# Patient Record
Sex: FEMALE | URBAN - METROPOLITAN AREA
[De-identification: names, ages, dates, MRNs, and addresses within clinical notes are randomized per-mention and may not be internally consistent; named-entity substitution may affect disease eponyms.]

---

## 2019-06-28 ENCOUNTER — APPOINTMENT (RX ONLY)
Dept: URBAN - METROPOLITAN AREA CLINIC 26 | Facility: CLINIC | Age: 54
Setting detail: DERMATOLOGY
End: 2019-06-28

## 2019-06-28 DIAGNOSIS — L82.1 OTHER SEBORRHEIC KERATOSIS: ICD-10-CM

## 2019-06-28 DIAGNOSIS — L91.8 OTHER HYPERTROPHIC DISORDERS OF THE SKIN: ICD-10-CM

## 2019-06-28 DIAGNOSIS — D22 MELANOCYTIC NEVI: ICD-10-CM

## 2019-06-28 DIAGNOSIS — D18.0 HEMANGIOMA: ICD-10-CM

## 2019-06-28 DIAGNOSIS — Z85.828 PERSONAL HISTORY OF OTHER MALIGNANT NEOPLASM OF SKIN: ICD-10-CM

## 2019-06-28 DIAGNOSIS — L81.4 OTHER MELANIN HYPERPIGMENTATION: ICD-10-CM

## 2019-06-28 PROBLEM — D48.5 NEOPLASM OF UNCERTAIN BEHAVIOR OF SKIN: Status: ACTIVE | Noted: 2019-06-28

## 2019-06-28 PROBLEM — D18.01 HEMANGIOMA OF SKIN AND SUBCUTANEOUS TISSUE: Status: ACTIVE | Noted: 2019-06-28

## 2019-06-28 PROBLEM — D23.5 OTHER BENIGN NEOPLASM OF SKIN OF TRUNK: Status: ACTIVE | Noted: 2019-06-28

## 2019-06-28 PROBLEM — D22.9 MELANOCYTIC NEVI, UNSPECIFIED: Status: ACTIVE | Noted: 2019-06-28

## 2019-06-28 PROCEDURE — ? BIOPSY BY SHAVE METHOD

## 2019-06-28 PROCEDURE — ? DIAGNOSIS COMMENT

## 2019-06-28 PROCEDURE — 11102 TANGNTL BX SKIN SINGLE LES: CPT

## 2019-06-28 PROCEDURE — ? COUNSELING

## 2019-06-28 PROCEDURE — ? ADDITIONAL NOTES

## 2019-06-28 PROCEDURE — 99214 OFFICE O/P EST MOD 30 MIN: CPT | Mod: 25

## 2019-06-28 PROCEDURE — 11103 TANGNTL BX SKIN EA SEP/ADDL: CPT

## 2019-06-28 PROCEDURE — ? SUNSCREEN RECOMMENDATIONS

## 2019-06-28 ASSESSMENT — LOCATION DETAILED DESCRIPTION DERM
LOCATION DETAILED: EPIGASTRIC SKIN
LOCATION DETAILED: LEFT RIB CAGE
LOCATION DETAILED: LEFT RIB CAGE

## 2019-06-28 ASSESSMENT — LOCATION SIMPLE DESCRIPTION DERM
LOCATION SIMPLE: ABDOMEN
LOCATION SIMPLE: ABDOMEN

## 2019-06-28 ASSESSMENT — LOCATION ZONE DERM
LOCATION ZONE: TRUNK
LOCATION ZONE: TRUNK

## 2019-06-28 NOTE — PROCEDURE: ADDITIONAL NOTES
Additional Notes: Shave removal with lidocaine and epi, included in path bottle of left axillae (multiple lesions present), necessary as rubbing on clothing. Patient ok to pay out of pocket if not covered.
Detail Level: Simple

## 2019-06-28 NOTE — PROCEDURE: BIOPSY BY SHAVE METHOD
Detail Level: Detailed
Billing Type: Third-Party Bill
Lab: -17
Destruction After The Procedure: No
Post-Care Instructions: I reviewed with the patient in detail post-care instructions. Patient is to keep the biopsy site dry overnight, and then apply bacitracin twice daily until healed. Patient may apply hydrogen peroxide soaks to remove any crusting.
Cryotherapy Text: The wound bed was treated with cryotherapy after the biopsy was performed.
Size Of Lesion In Cm: 0
Anesthesia Type: 1% lidocaine with epinephrine
Lab Facility: 3
Wound Care: Petrolatum
Depth Of Biopsy: dermis
Notification Instructions: Patient will be notified of biopsy results. However, patient instructed to call the office if not contacted within 2 weeks.
Electrodesiccation Text: The wound bed was treated with electrodesiccation after the biopsy was performed.
Path Notes (To The Dermatopathologist): .
Biopsy Type: H and E
Consent: Written consent was obtained and risks were reviewed including but not limited to scarring, infection, bleeding, scabbing, incomplete removal, nerve damage and allergy to anesthesia.
Type Of Destruction Used: Curettage
Anesthesia Volume In Cc (Will Not Render If 0): 0.5
Dressing: bandage
Electrodesiccation And Curettage Text: The wound bed was treated with electrodesiccation and curettage after the biopsy was performed.
Was A Bandage Applied: Yes
Silver Nitrate Text: The wound bed was treated with silver nitrate after the biopsy was performed.
Hemostasis: Electrocautery
Biopsy Method: Dermablade
Curettage Text: The wound bed was treated with curettage after the biopsy was performed.
Path Notes (To The Dermatopathologist): .

## 2019-12-16 ENCOUNTER — HOSPITAL ENCOUNTER (EMERGENCY)
Facility: HOSPITAL | Age: 54
Discharge: HOME | End: 2019-12-16
Attending: EMERGENCY MEDICINE | Admitting: EMERGENCY MEDICINE
Payer: COMMERCIAL

## 2019-12-16 ENCOUNTER — APPOINTMENT (EMERGENCY)
Dept: RADIOLOGY | Facility: HOSPITAL | Age: 54
End: 2019-12-16
Payer: COMMERCIAL

## 2019-12-16 VITALS
WEIGHT: 253 LBS | RESPIRATION RATE: 20 BRPM | OXYGEN SATURATION: 95 % | HEIGHT: 65 IN | TEMPERATURE: 97.8 F | SYSTOLIC BLOOD PRESSURE: 161 MMHG | HEART RATE: 80 BPM | DIASTOLIC BLOOD PRESSURE: 85 MMHG | BODY MASS INDEX: 42.15 KG/M2

## 2019-12-16 DIAGNOSIS — R07.9 CHEST PAIN: Primary | ICD-10-CM

## 2019-12-16 LAB
ALBUMIN SERPL BCP-MCNC: 3.7 G/DL (ref 3.5–5)
ALP SERPL-CCNC: 75 U/L (ref 46–116)
ALT SERPL W P-5'-P-CCNC: 43 U/L (ref 12–78)
ANION GAP SERPL CALCULATED.3IONS-SCNC: 7 MMOL/L (ref 4–13)
AST SERPL W P-5'-P-CCNC: 40 U/L (ref 5–45)
ATRIAL RATE: 77 BPM
BASOPHILS # BLD AUTO: 0.05 THOUSANDS/ΜL (ref 0–0.1)
BASOPHILS NFR BLD AUTO: 1 % (ref 0–1)
BILIRUB SERPL-MCNC: 0.4 MG/DL (ref 0.2–1)
BUN SERPL-MCNC: 12 MG/DL (ref 5–25)
CALCIUM SERPL-MCNC: 9.2 MG/DL (ref 8.3–10.1)
CHLORIDE SERPL-SCNC: 105 MMOL/L (ref 100–108)
CO2 SERPL-SCNC: 28 MMOL/L (ref 21–32)
CREAT SERPL-MCNC: 0.68 MG/DL (ref 0.6–1.3)
D DIMER PPP FEU-MCNC: 0.28 UG/ML FEU
EOSINOPHIL # BLD AUTO: 0.15 THOUSAND/ΜL (ref 0–0.61)
EOSINOPHIL NFR BLD AUTO: 2 % (ref 0–6)
ERYTHROCYTE [DISTWIDTH] IN BLOOD BY AUTOMATED COUNT: 12.4 % (ref 11.6–15.1)
GFR SERPL CREATININE-BSD FRML MDRD: 101 ML/MIN/1.73SQ M
GLUCOSE SERPL-MCNC: 75 MG/DL (ref 65–140)
HCT VFR BLD AUTO: 43.3 % (ref 34.8–46.1)
HGB BLD-MCNC: 14.1 G/DL (ref 11.5–15.4)
IMM GRANULOCYTES # BLD AUTO: 0.05 THOUSAND/UL (ref 0–0.2)
IMM GRANULOCYTES NFR BLD AUTO: 1 % (ref 0–2)
LYMPHOCYTES # BLD AUTO: 2.41 THOUSANDS/ΜL (ref 0.6–4.47)
LYMPHOCYTES NFR BLD AUTO: 31 % (ref 14–44)
MCH RBC QN AUTO: 27.8 PG (ref 26.8–34.3)
MCHC RBC AUTO-ENTMCNC: 32.6 G/DL (ref 31.4–37.4)
MCV RBC AUTO: 85 FL (ref 82–98)
MONOCYTES # BLD AUTO: 0.54 THOUSAND/ΜL (ref 0.17–1.22)
MONOCYTES NFR BLD AUTO: 7 % (ref 4–12)
NEUTROPHILS # BLD AUTO: 4.55 THOUSANDS/ΜL (ref 1.85–7.62)
NEUTS SEG NFR BLD AUTO: 58 % (ref 43–75)
NRBC BLD AUTO-RTO: 0 /100 WBCS
P AXIS: 41 DEGREES
PLATELET # BLD AUTO: 412 THOUSANDS/UL (ref 149–390)
PMV BLD AUTO: 9 FL (ref 8.9–12.7)
POTASSIUM SERPL-SCNC: 4.8 MMOL/L (ref 3.5–5.3)
PR INTERVAL: 164 MS
PROT SERPL-MCNC: 8 G/DL (ref 6.4–8.2)
QRS AXIS: 48 DEGREES
QRSD INTERVAL: 76 MS
QT INTERVAL: 390 MS
QTC INTERVAL: 441 MS
RBC # BLD AUTO: 5.07 MILLION/UL (ref 3.81–5.12)
SODIUM SERPL-SCNC: 140 MMOL/L (ref 136–145)
T WAVE AXIS: 53 DEGREES
TROPONIN I SERPL-MCNC: <0.02 NG/ML
VENTRICULAR RATE: 77 BPM
WBC # BLD AUTO: 7.75 THOUSAND/UL (ref 4.31–10.16)

## 2019-12-16 PROCEDURE — 71046 X-RAY EXAM CHEST 2 VIEWS: CPT

## 2019-12-16 PROCEDURE — 85379 FIBRIN DEGRADATION QUANT: CPT | Performed by: PHYSICIAN ASSISTANT

## 2019-12-16 PROCEDURE — 80053 COMPREHEN METABOLIC PANEL: CPT | Performed by: PHYSICIAN ASSISTANT

## 2019-12-16 PROCEDURE — 93005 ELECTROCARDIOGRAM TRACING: CPT

## 2019-12-16 PROCEDURE — 36415 COLL VENOUS BLD VENIPUNCTURE: CPT | Performed by: PHYSICIAN ASSISTANT

## 2019-12-16 PROCEDURE — 84484 ASSAY OF TROPONIN QUANT: CPT | Performed by: PHYSICIAN ASSISTANT

## 2019-12-16 PROCEDURE — 85025 COMPLETE CBC W/AUTO DIFF WBC: CPT | Performed by: PHYSICIAN ASSISTANT

## 2019-12-16 PROCEDURE — 93010 ELECTROCARDIOGRAM REPORT: CPT | Performed by: INTERNAL MEDICINE

## 2019-12-16 PROCEDURE — 99285 EMERGENCY DEPT VISIT HI MDM: CPT | Performed by: PHYSICIAN ASSISTANT

## 2019-12-16 PROCEDURE — 99285 EMERGENCY DEPT VISIT HI MDM: CPT

## 2019-12-16 RX ORDER — FOLIC ACID/MULTIVIT,IRON,MINER .4-18-35
TABLET,CHEWABLE ORAL
COMMUNITY

## 2019-12-16 RX ORDER — ASCORBIC ACID 500 MG
TABLET ORAL
COMMUNITY

## 2019-12-16 NOTE — DISCHARGE INSTRUCTIONS
Follow up with family doctor in 1-2 days for recheck  Obtain stress test   Return to ER if symptoms worsen

## 2019-12-16 NOTE — ED PROVIDER NOTES
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 and was in [place] a couple weeks ago  She denies fevers, chills, but did have a URI 2 weeks ago  Patient states she has been under increased stress at her job lately  History provided by:  Patient  Chest Pain   Pain location:  L chest and substernal area  Pain quality: aching and tightness    Pain radiates to:  Does not radiate  Pain radiates to the back: no    Pain severity:  Mild  Onset quality:  Gradual  Duration:  2 weeks  Timing:  Intermittent  Progression:  Unchanged  Chronicity:  New  Context: at rest    Relieved by:  Nothing  Worsened by:  Nothing tried  Ineffective treatments:  None tried  Associated symptoms: cough    Associated symptoms: no abdominal pain, no altered mental status, no back pain, no diaphoresis, no dizziness, no fever, no heartburn, no lower extremity edema, no nausea, no numbness, no palpitations, no shortness of breath, not vomiting and no weakness    Risk factors: obesity    Risk factors: no aortic disease, no birth control, no coronary artery disease, no diabetes mellitus, no high cholesterol, no hypertension, no immobilization, not pregnant, no prior DVT/PE, no smoking and no surgery        Prior to Admission Medications   Prescriptions Last Dose Informant Patient Reported? Taking?   ascorbic acid (VITAMIN C) 500 mg tablet   Yes No   Sig: Take by mouth   multivitamin-iron-minerals-folic acid (CENTRUM) chewable tablet   Yes No   Sig: Chew      Facility-Administered Medications: None       History reviewed  No pertinent past medical history      Past Surgical History:   Procedure Laterality Date    ANAL FISSURECTOMY      LAPAROSCOPIC GASTRIC BANDING      1/12       History reviewed  No pertinent family history  I have reviewed and agree with the history as documented  Social History     Tobacco Use    Smoking status: Never Smoker    Smokeless tobacco: Never Used   Substance Use Topics    Alcohol use: Yes    Drug use: Never        Review of Systems   Constitutional: Negative for diaphoresis and fever  HENT: Negative  Respiratory: Positive for cough  Negative for shortness of breath  Cardiovascular: Positive for chest pain  Negative for palpitations and leg swelling  Gastrointestinal: Negative for abdominal pain, heartburn, nausea and vomiting  Musculoskeletal: Negative for back pain and neck pain  Skin: Negative for color change and rash  Neurological: Negative for dizziness, weakness and numbness  Psychiatric/Behavioral: Negative for confusion  All other systems reviewed and are negative  Physical Exam  Physical Exam   Constitutional: She is oriented to person, place, and time  She appears well-developed and well-nourished  She is cooperative  She does not appear ill  No distress  HENT:   Head: Normocephalic and atraumatic  Right Ear: Hearing normal    Left Ear: Hearing normal    Nose: Nose normal    Mouth/Throat: Oropharynx is clear and moist and mucous membranes are normal    Eyes: Conjunctivae are normal    Neck: Normal range of motion  Cardiovascular: Normal rate, regular rhythm and normal heart sounds  No murmur heard  Pulmonary/Chest: Effort normal and breath sounds normal  She has no wheezes  She has no rhonchi  She has no rales  Abdominal: Soft  Normal appearance  There is no tenderness  Musculoskeletal: Normal range of motion  She exhibits no edema or tenderness  Neurological: She is alert and oriented to person, place, and time  She has normal strength  No cranial nerve deficit or sensory deficit  Gait normal  GCS eye subscore is 4   GCS verbal subscore is 5  GCS motor subscore is 6  Skin: Skin is warm and dry  No rash noted  She is not diaphoretic  No pallor  Psychiatric: She has a normal mood and affect  Her speech is normal  Cognition and memory are normal    Vitals reviewed        Vital Signs  ED Triage Vitals   Temperature Pulse Respirations Blood Pressure SpO2   12/16/19 1515 12/16/19 1515 12/16/19 1515 12/16/19 1515 12/16/19 1515   97 8 °F (36 6 °C) 80 20 161/85 95 %      Temp src Heart Rate Source Patient Position - Orthostatic VS BP Location FiO2 (%)   -- -- -- -- --             Pain Score       12/16/19 1512       1           Vitals:    12/16/19 1515   BP: 161/85   Pulse: 80         Visual Acuity      ED Medications  Medications - No data to display    Diagnostic Studies  Results Reviewed     Procedure Component Value Units Date/Time    D-Dimer [[id number]]  (Normal) Collected:  12/16/19 1548    Lab Status:  Final result Specimen:  Blood from Arm, Left Updated:  12/16/19 1616     D-Dimer, Quant 0 28 ug/ml FEU     Troponin I [[id number]]  (Normal) Collected:  12/16/19 1525    Lab Status:  Final result Specimen:  Blood from Arm, Left Updated:  12/16/19 1555     Troponin I <0 02 ng/mL     Comprehensive metabolic panel [[id number]] Collected:  12/16/19 1525    Lab Status:  Final result Specimen:  Blood from Arm, Left Updated:  12/16/19 1552     Sodium 140 mmol/L      Potassium 4 8 mmol/L      Chloride 105 mmol/L      CO2 28 mmol/L      ANION GAP 7 mmol/L      BUN 12 mg/dL      Creatinine 0 68 mg/dL      Glucose 75 mg/dL      Calcium 9 2 mg/dL      AST 40 U/L      ALT 43 U/L      Alkaline Phosphatase 75 U/L      Total Protein 8 0 g/dL      Albumin 3 7 g/dL      Total Bilirubin 0 40 mg/dL      eGFR 101 ml/min/1 73sq m     Narrative:       Yojana guidelines for Chronic Kidney Disease (CKD):     Stage 1 with normal or high GFR (GFR > 90 mL/min/1 73 square meters)    Stage 2 Mild CKD (GFR = 60-89 mL/min/1 73 square meters)   Stage 3A Moderate CKD (GFR = 45-59 mL/min/1 73 square meters)    Stage 3B Moderate CKD (GFR = 30-44 mL/min/1 73 square meters)    Stage 4 Severe CKD (GFR = 15-29 mL/min/1 73 square meters)    Stage 5 End Stage CKD (GFR <15 mL/min/1 73 square meters)  Note: GFR calculation is accurate only with a steady state creatinine    CBC and differential [[id number]]  (Abnormal) Collected:  12/16/19 1525    Lab Status:  Final result Specimen:  Blood from Arm, Left Updated:  12/16/19 1535     WBC 7 75 Thousand/uL      RBC 5 07 Million/uL      Hemoglobin 14 1 g/dL      Hematocrit 43 3 %      MCV 85 fL      MCH 27 8 pg      MCHC 32 6 g/dL      RDW 12 4 %      MPV 9 0 fL      Platelets 672 Thousands/uL      nRBC 0 /100 WBCs      Neutrophils Relative 58 %      Immat GRANS % 1 %      Lymphocytes Relative 31 %      Monocytes Relative 7 %      Eosinophils Relative 2 %      Basophils Relative 1 %      Neutrophils Absolute 4 55 Thousands/µL      Immature Grans Absolute 0 05 Thousand/uL      Lymphocytes Absolute 2 41 Thousands/µL      Monocytes Absolute 0 54 Thousand/µL      Eosinophils Absolute 0 15 Thousand/µL      Basophils Absolute 0 05 Thousands/µL                  XR chest 2 views   ED Interpretation by Camden Peters PA-C (12/16 0148)   No acute abnormalities                    Procedures  ECG 12 Lead Documentation Only  Date/Time: 12/16/2019 3:22 PM  Performed by: Camden Peters PA-C  Authorized by: Camden Peters PA-C     Indications / Diagnosis:  Chest pain  ECG reviewed by me, the ED Provider: yes    Patient location:  ED  Previous ECG:     Previous ECG:  Unavailable  Rate:     ECG rate:  77  Rhythm:     Rhythm: sinus rhythm    Conduction:     Conduction: normal    ST segments:     ST segments:  Normal  T waves:     T waves: normal               ED Course         HEART Risk Score      Most Recent Value   History  0 Filed at: 12/16/2019 1601   ECG  0 Filed at: 12/16/2019 1601   Age  1 Filed at: 12/16/2019 1601 Risk Factors  0 Filed at: 12/16/2019 1601   Troponin  0 Filed at: 12/16/2019 1601   Heart Score Risk Calculator   History  0 Filed at: 12/16/2019 1601   ECG  0 Filed at: 12/16/2019 1601   Age  1 Filed at: 12/16/2019 1601   Risk Factors  0 Filed at: 12/16/2019 1601   Troponin  0 Filed at: 12/16/2019 1601   HEART Score  1 Filed at: 12/16/2019 1601   HEART Score  1 Filed at: 12/16/2019 1601                      Wells' Criteria for PE      Most Recent Value   Wells' Criteria for PE   Clinical signs and symptoms of DVT  0 Filed at: 12/16/2019 1621   PE is primary diagnosis or equally likely  0 Filed at: 12/16/2019 1621   HR >100  0 Filed at: 12/16/2019 1621   Immobilization at least 3 days or Surgery in the previous 4 weeks  0 Filed at: 12/16/2019 1621   Previous, objectively diagnosed PE or DVT  0 Filed at: 12/16/2019 1621   Hemoptysis  0 Filed at: 12/16/2019 1621   Malignancy with treatment within 6 months or palliative  0 Filed at: 12/16/2019 1621   Wells' Criteria Total  0 Filed at: 12/16/2019 1621            [facility]  Number of Diagnoses or Management Options  Chest pain: new and requires workup  Diagnosis management comments: Patient with chest pain, will order labs, CXR, EKG to r/o cardiac or pulmonary disease  Amount and/or Complexity of Data Reviewed  Clinical lab tests: ordered and reviewed  Tests in the radiology section of CPT®: ordered and reviewed    Patient Progress  Patient progress: stable        Disposition  Final diagnoses:   Chest pain     Time reflects when diagnosis was documented in both MDM as applicable and the Disposition within this note     Time User Action Codes Description Comment    12/16/2019  4:19 PM Metro Prows Add [R07 9] Chest pain       ED Disposition     ED Disposition Condition Date/Time Comment    Discharge Stable Mon Dec 16, 2019  4:19 PM Angelic Galeana discharge to home/self care              Follow-up Information     Follow up With Specialties Details Why Contact Info    your family doctor  Call in 1 day For recheck           Patient's Medications   Discharge Prescriptions    No medications on file     Outpatient Discharge Orders   Stress test only, exercise   Standing Status: Future Standing Exp   Date: 12/16/23       ED Provider  Electronically Signed by           Cristy Fox PA-C  12/16/19 2043

## 2021-01-08 ENCOUNTER — APPOINTMENT (RX ONLY)
Dept: URBAN - METROPOLITAN AREA CLINIC 26 | Facility: CLINIC | Age: 56
Setting detail: DERMATOLOGY
End: 2021-01-08

## 2021-01-08 DIAGNOSIS — D18.0 HEMANGIOMA: ICD-10-CM

## 2021-01-08 DIAGNOSIS — L81.4 OTHER MELANIN HYPERPIGMENTATION: ICD-10-CM

## 2021-01-08 DIAGNOSIS — L40.0 PSORIASIS VULGARIS: ICD-10-CM

## 2021-01-08 DIAGNOSIS — L82.1 OTHER SEBORRHEIC KERATOSIS: ICD-10-CM

## 2021-01-08 DIAGNOSIS — D22 MELANOCYTIC NEVI: ICD-10-CM

## 2021-01-08 DIAGNOSIS — Z85.828 PERSONAL HISTORY OF OTHER MALIGNANT NEOPLASM OF SKIN: ICD-10-CM

## 2021-01-08 PROBLEM — D23.5 OTHER BENIGN NEOPLASM OF SKIN OF TRUNK: Status: ACTIVE | Noted: 2021-01-08

## 2021-01-08 PROBLEM — D22.9 MELANOCYTIC NEVI, UNSPECIFIED: Status: ACTIVE | Noted: 2021-01-08

## 2021-01-08 PROBLEM — D18.01 HEMANGIOMA OF SKIN AND SUBCUTANEOUS TISSUE: Status: ACTIVE | Noted: 2021-01-08

## 2021-01-08 PROBLEM — D22.72 MELANOCYTIC NEVI OF LEFT LOWER LIMB, INCLUDING HIP: Status: ACTIVE | Noted: 2021-01-08

## 2021-01-08 PROCEDURE — ? SUNSCREEN RECOMMENDATIONS

## 2021-01-08 PROCEDURE — ? TREATMENT REGIMEN

## 2021-01-08 PROCEDURE — ? COUNSELING

## 2021-01-08 PROCEDURE — 99213 OFFICE O/P EST LOW 20 MIN: CPT

## 2021-01-08 PROCEDURE — ? ADDITIONAL NOTES

## 2021-01-08 PROCEDURE — ? FULL BODY SKIN EXAM

## 2021-01-08 ASSESSMENT — LOCATION SIMPLE DESCRIPTION DERM
LOCATION SIMPLE: LEFT POSTERIOR THIGH
LOCATION SIMPLE: LEFT EAR

## 2021-01-08 ASSESSMENT — LOCATION DETAILED DESCRIPTION DERM
LOCATION DETAILED: LEFT CRUS OF HELIX
LOCATION DETAILED: LEFT DISTAL POSTERIOR THIGH

## 2021-01-08 ASSESSMENT — LOCATION ZONE DERM
LOCATION ZONE: LEG
LOCATION ZONE: EAR

## 2021-01-08 NOTE — PROCEDURE: TREATMENT REGIMEN
Action 4: Continue
Otc Regimen: Antidandruff shampoo
Detail Level: Simple
Other Instructions: Patient has topicals at home and is not motivated to treat

## 2021-03-10 DIAGNOSIS — Z23 ENCOUNTER FOR IMMUNIZATION: ICD-10-CM

## 2021-06-02 ENCOUNTER — RECORDS - HEALTHEAST (OUTPATIENT)
Dept: ADMINISTRATIVE | Facility: CLINIC | Age: 56
End: 2021-06-02

## 2022-04-04 ENCOUNTER — APPOINTMENT (RX ONLY)
Dept: URBAN - METROPOLITAN AREA CLINIC 6 | Facility: CLINIC | Age: 57
Setting detail: DERMATOLOGY
End: 2022-04-04

## 2022-04-04 DIAGNOSIS — L65.8 OTHER SPECIFIED NONSCARRING HAIR LOSS: ICD-10-CM

## 2022-04-04 DIAGNOSIS — Z85.828 PERSONAL HISTORY OF OTHER MALIGNANT NEOPLASM OF SKIN: ICD-10-CM | Status: STABLE

## 2022-04-04 DIAGNOSIS — Z71.89 OTHER SPECIFIED COUNSELING: ICD-10-CM

## 2022-04-04 DIAGNOSIS — L81.4 OTHER MELANIN HYPERPIGMENTATION: ICD-10-CM

## 2022-04-04 DIAGNOSIS — L82.1 OTHER SEBORRHEIC KERATOSIS: ICD-10-CM

## 2022-04-04 DIAGNOSIS — D22 MELANOCYTIC NEVI: ICD-10-CM

## 2022-04-04 DIAGNOSIS — D18.0 HEMANGIOMA: ICD-10-CM

## 2022-04-04 PROBLEM — D18.01 HEMANGIOMA OF SKIN AND SUBCUTANEOUS TISSUE: Status: ACTIVE | Noted: 2022-04-04

## 2022-04-04 PROBLEM — D22.5 MELANOCYTIC NEVI OF TRUNK: Status: ACTIVE | Noted: 2022-04-04

## 2022-04-04 PROBLEM — D23.5 OTHER BENIGN NEOPLASM OF SKIN OF TRUNK: Status: ACTIVE | Noted: 2022-04-04

## 2022-04-04 PROCEDURE — 99203 OFFICE O/P NEW LOW 30 MIN: CPT

## 2022-04-04 PROCEDURE — ? COUNSELING

## 2022-04-04 PROCEDURE — ? PRESCRIPTION MEDICATION MANAGEMENT

## 2022-04-04 PROCEDURE — ? SUNSCREEN TREATMENT REGIMEN

## 2022-04-04 PROCEDURE — ? PRESCRIPTION

## 2022-04-04 RX ORDER — MINOXIDIL 2.5 MG/1
1 TABLET ORAL QD
Qty: 30 | Refills: 3 | Status: ERX | COMMUNITY
Start: 2022-04-04

## 2022-04-04 RX ADMIN — MINOXIDIL 1: 2.5 TABLET ORAL at 00:00

## 2022-04-04 ASSESSMENT — LOCATION SIMPLE DESCRIPTION DERM
LOCATION SIMPLE: ABDOMEN
LOCATION SIMPLE: UPPER BACK
LOCATION SIMPLE: LEFT HAND
LOCATION SIMPLE: RIGHT HAND
LOCATION SIMPLE: CHEST
LOCATION SIMPLE: SCALP
LOCATION SIMPLE: ANTERIOR SCALP
LOCATION SIMPLE: LEFT FOREHEAD

## 2022-04-04 ASSESSMENT — LOCATION DETAILED DESCRIPTION DERM
LOCATION DETAILED: SUPERIOR THORACIC SPINE
LOCATION DETAILED: MIDDLE STERNUM
LOCATION DETAILED: MID-FRONTAL SCALP
LOCATION DETAILED: SUBXIPHOID
LOCATION DETAILED: RIGHT SUPERIOR PARIETAL SCALP
LOCATION DETAILED: LEFT ULNAR DORSAL HAND
LOCATION DETAILED: LEFT INFERIOR FOREHEAD
LOCATION DETAILED: EPIGASTRIC SKIN
LOCATION DETAILED: LEFT RADIAL DORSAL HAND
LOCATION DETAILED: RIGHT RADIAL DORSAL HAND

## 2022-04-04 ASSESSMENT — LOCATION ZONE DERM
LOCATION ZONE: FACE
LOCATION ZONE: TRUNK
LOCATION ZONE: SCALP
LOCATION ZONE: HAND

## 2022-05-05 ENCOUNTER — APPOINTMENT (RX ONLY)
Dept: URBAN - METROPOLITAN AREA CLINIC 20 | Facility: CLINIC | Age: 57
Setting detail: DERMATOLOGY
End: 2022-05-05

## 2022-05-05 DIAGNOSIS — Z41.9 ENCOUNTER FOR PROCEDURE FOR PURPOSES OTHER THAN REMEDYING HEALTH STATE, UNSPECIFIED: ICD-10-CM

## 2022-05-05 PROCEDURE — ? FACIAL

## 2022-05-05 ASSESSMENT — LOCATION DETAILED DESCRIPTION DERM
LOCATION DETAILED: LEFT CENTRAL MALAR CHEEK
LOCATION DETAILED: LEFT FOREHEAD

## 2022-05-05 ASSESSMENT — LOCATION ZONE DERM: LOCATION ZONE: FACE

## 2022-05-05 ASSESSMENT — LOCATION SIMPLE DESCRIPTION DERM
LOCATION SIMPLE: LEFT CHEEK
LOCATION SIMPLE: LEFT FOREHEAD

## 2022-05-05 NOTE — PROCEDURE: FACIAL
Exfoliation Type: dermaplane
Detail Level: Detailed
Price (Use Numbers Only, No Special Characters Or $): 140.00
Extraction Method: cotton-tipped applicator

## 2022-05-09 ENCOUNTER — TELEPHONE (OUTPATIENT)
Dept: SCHEDULING | Facility: IMAGING CENTER | Age: 57
End: 2022-05-09

## 2022-05-17 ENCOUNTER — APPOINTMENT (RX ONLY)
Dept: URBAN - METROPOLITAN AREA CLINIC 20 | Facility: CLINIC | Age: 57
Setting detail: DERMATOLOGY
End: 2022-05-17

## 2022-05-17 DIAGNOSIS — Z41.9 ENCOUNTER FOR PROCEDURE FOR PURPOSES OTHER THAN REMEDYING HEALTH STATE, UNSPECIFIED: ICD-10-CM

## 2022-05-17 PROCEDURE — ? PULSED-DYE LASER

## 2022-05-17 NOTE — PROCEDURE: PULSED-DYE LASER
Spot Size: 7 mm
Spot Size: 12 mm
Cryogen Time (Ms): 30
Immediate Endpoint: purpura
Treated Area: small area
Pulse Duration: 1.5 ms
Location (Required For Details To Render In Note): chin
Pulse Duration: 10 ms
Location Override: jaw
Fluence In J/Cm2 (Optional): 14.0
Delay Time (Ms): 20
Laser Type: Vbeam 595nm
Spot Size: 3 mm
Consent: Written consent obtained, risks reviewed including but not limited to crusting, scabbing, blistering, scarring, darker or lighter pigmentary change, incidental hair removal, bruising, and/or incomplete removal.
Post-Procedure Care: Sunscreen
Pulse Duration: 3 ms
Location Override: jaw line
Price (Use Numbers Only, No Special Characters Or $): 75.00
Detail Level: Simple
Post-Care Instructions: I reviewed with the patient in detail post-care instructions. Patient should stay away from the sun and wear sun protection until treated areas are fully healed.
Fluence In J/Cm2 (Optional): 5.75

## 2022-05-17 NOTE — HPI: OTHER
Condition:: Cherry Angiomas x2, over all rednesses
Please Describe Your Condition:: Wears sunscreen daily.\\nNot prone to cold sores.

## 2022-06-03 ENCOUNTER — OFFICE VISIT (OUTPATIENT)
Dept: MEDICAL GROUP | Facility: LAB | Age: 57
End: 2022-06-03
Payer: COMMERCIAL

## 2022-06-03 VITALS
BODY MASS INDEX: 31.02 KG/M2 | TEMPERATURE: 97.7 F | RESPIRATION RATE: 16 BRPM | WEIGHT: 193 LBS | SYSTOLIC BLOOD PRESSURE: 118 MMHG | HEART RATE: 75 BPM | HEIGHT: 66 IN | DIASTOLIC BLOOD PRESSURE: 64 MMHG | OXYGEN SATURATION: 95 %

## 2022-06-03 DIAGNOSIS — E55.9 VITAMIN D DEFICIENCY: ICD-10-CM

## 2022-06-03 DIAGNOSIS — Z98.84 HISTORY OF LAPAROSCOPIC ADJUSTABLE GASTRIC BANDING: ICD-10-CM

## 2022-06-03 DIAGNOSIS — Z13.6 ENCOUNTER FOR SCREENING FOR CARDIOVASCULAR DISORDERS: ICD-10-CM

## 2022-06-03 DIAGNOSIS — Z85.828 H/O NONMELANOMA SKIN CANCER: ICD-10-CM

## 2022-06-03 PROCEDURE — 99203 OFFICE O/P NEW LOW 30 MIN: CPT | Performed by: FAMILY MEDICINE

## 2022-06-03 RX ORDER — CYCLOSPORINE 0.5 MG/ML
1 EMULSION OPHTHALMIC 2 TIMES DAILY
COMMUNITY

## 2022-06-03 RX ORDER — LORATADINE 10 MG/1
10 TABLET ORAL DAILY
COMMUNITY

## 2022-06-03 RX ORDER — LIFITEGRAST 50 MG/ML
SOLUTION/ DROPS OPHTHALMIC
COMMUNITY
Start: 2022-04-22 | End: 2022-08-25

## 2022-06-03 RX ORDER — MINOXIDIL 2.5 MG/1
1.25 TABLET ORAL
COMMUNITY
Start: 2022-05-29

## 2022-06-03 ASSESSMENT — PATIENT HEALTH QUESTIONNAIRE - PHQ9: CLINICAL INTERPRETATION OF PHQ2 SCORE: 0

## 2022-06-03 NOTE — PROGRESS NOTES
"Subjective:     Chief Complaint   Patient presents with   • Establish Care   • Requesting Labs   • Back Pain         HPI:   Works for [email or web address].   Not traveling anymore which is good.   Romina presents today to establish care.   From [place].     In the past some mildly elevated glucose. No cholesterol.   Using [employer] for weight loss program.   H/o lap band 2011-12 sometime. When moved here 265 lbs, doing well with dietary changes, etc. Would like to continue this journey. Highest weight 284lbs.   She has in the past had a lot of neck and upper back pain with weight on her chest. Interested in reduction in the future.   Lost 51 lbs thus far with this program.           Current Outpatient Medications Ordered in Epic   Medication Sig Dispense Refill   • loratadine (CLARITIN) 10 MG Tab Take 10 mg by mouth every day.     • Fexofenadine HCl (MUCINEX ALLERGY PO) Take  by mouth.     • Multiple Vitamin (ONE-A-DAY ESSENTIAL PO) Take  by mouth.     • Ascorbic Acid (VITAMIN C PO) Take  by mouth.     • minoxidil (LONITEN) 2.5 MG Tab Take 1.25 mg by mouth every day.     • XIIDRA 5 % Solution INSTILL 1 DROP INTO BOTH EYES TWICE A DAY     • cyclosporin (RESTASIS) 0.05 % ophthalmic emulsion Administer 1 Drop into both eyes 2 times a day.       No current [facility]-ordered facility-administered medications on file.         ROS:  Gen: no fevers/chills, no changes in weight  Eyes: no changes in vision  ENT: no sore throat, no hearing loss, no bloody nose  Pulm: no sob, no cough  CV: no chest pain, no palpitations  GI: no nausea/vomiting, no diarrhea  : no dysuria  MSk: no myalgias  Skin: no rash  Neuro: no headaches, no numbness/tingling  Heme/Lymph: no easy bruising      Objective:     Exam:  /64 (BP Location: Left arm, Patient Position: Sitting, BP Cuff Size: Large adult)   Pulse 75   Temp 36.5 °C (97.7 °F) (Temporal)   Resp 16   Ht 1.676 m (5' 6\")   Wt 87.5 kg (193 lb)   SpO2 95%   BMI 31.15 kg/m²  Body " mass index is 31.15 kg/m².    Gen: Alert and oriented, No apparent distress.  Neck: Neck is supple without lymphadenopathy.  Lungs: Normal effort, CTA bilaterally, no wheezes, rhonchi, or rales  CV: Regular rate and rhythm. No murmurs, rubs, or gallops.  Ext: No clubbing, cyanosis, edema.      Assessment & Plan:     57 y.o. female with the following -   1. Encounter for screening for cardiovascular disorders  Discussed routine blood work and screening for cardiovascular disorders.  - CBC WITH DIFFERENTIAL; Future  - Lipid Profile; Future    2. BMI 31.0-31.9,adult  Discussed goal weight of around 150 pounds to start.  She is done very well with her program thus far and likely within the end of the year can reach this.  We did discuss the importance of continuing to maintain good nutrition as well and we will also get some blood work to assess any other risk factors and or barriers to weight loss.  - Comp Metabolic Panel; Future  - HEMOGLOBIN A1C; Future  - Lipid Profile; Future  - TSH WITH REFLEX TO FT4; Future    3. Vitamin D deficiency  Has been vitamin D deficient in the past.  We will monitor therapy.  - VITAMIN D,25 HYDROXY; Future    4. History of laparoscopic adjustable gastric banding  She does have a current Lap-Band and would like to have this removed.  She does have some reflux associated with this and does not feel like she is having any benefit with regard to her weight loss.  She has not had a refilled for the last 6 to 7 years.  - Referral to General Surgery    5. H/O nonmelanoma skin cancer  Does see dermatology regularly.  Is very good about SPF and skin care in general.          No follow-ups on file.    Please note that this dictation was created using voice recognition software. I have made every reasonable attempt to correct obvious errors, but I expect that there are errors of grammar and possibly content that I did not discover before finalizing the note.

## 2022-06-03 NOTE — LETTER
[employer]  Pamela Mason M.D.  [address] [facility] [facility] [address] [address] [address]  [place] [address]  Fax: [phone]   Authorization for Release/Disclosure of   Protected Health Information   Name: DAVID TRIPLETT : 1965 SSN: [id number]   Address: [address] [place] [address]  [place] [address] Phone:    [phone] (home)    I authorize the entity listed below to release/disclose the PHI below to:   [facility]/Pamela Mason M.D. and Pamela Mason M.D.   Provider or Entity Name:  Dr олег griffin/[facility]   Address   City, [facility], [facility]  venkatesh Robles Phone:      Fax:     Reason for request: continuity of care   Information to be released:    [ xxx ] LAST COLONOSCOPY,  including any PATH REPORT and follow-up  [  ] LAST FIT/COLOGUARD RESULT [  ] LAST DEXA  [  ] LAST MAMMOGRAM  [  ] LAST PAP  [  ] LAST LABS [  ] RETINA EXAM REPORT  [  ] IMMUNIZATION RECORDS  [  ] Release all info      [  ] Check here and initial the line next to each item to release ALL health information INCLUDING  _____ Care and treatment for drug and / or alcohol abuse  _____ HIV testing, infection status, or AIDS  _____ Genetic Testing    DATES OF SERVICE OR TIME PERIOD TO BE DISCLOSED: _____________  I understand and acknowledge that:  * This Authorization may be revoked at any time by you in writing, except if your health information has already been used or disclosed.  * Your health information that will be used or disclosed as a result of you signing this authorization could be re-disclosed by the recipient. If this occurs, your re-disclosed health information may no longer be protected by State or Federal laws.  * You may refuse to sign this Authorization. Your refusal will not affect your ability to obtain treatment.  * This Authorization becomes effective upon signing and will  on (date) __________.      If no date is indicated, this Authorization will  one (1)  year from the signature date.    Name: Romina Farias Rojelio    Signature:   Date:     6/3/2022       PLEASE FAX REQUESTED RECORDS BACK TO: [phone]

## 2022-06-03 NOTE — LETTER
[employer]  Pamela Mason M.D.  [address]  [place] [address]  Fax: [phone]   Authorization for Release/Disclosure of   Protected Health Information   Name: DAVID TRIPLETT : 1965 SSN: [id number]   Address: [address] [place] [address]  [place] [address] Phone:    [phone] (home)    I authorize the entity listed below to release/disclose the PHI below to:   [facility]/Pamela Mason M.D. and Pamela Mason M.D.   Provider or Entity Name:  Eli durant [facility]   Address   City, State, [facility]   Phone:      Fax:     Reason for request: continuity of care   Information to be released:    [  ] LAST COLONOSCOPY,  including any PATH REPORT and follow-up  [  ] LAST FIT/COLOGUARD RESULT [  ] LAST DEXA  [  ] LAST MAMMOGRAM  [  ] LAST PAP  [  ] LAST LABS [  ] RETINA EXAM REPORT  [  ] IMMUNIZATION RECORDS  [ xxx  ] Release all info      [  ] Check here and initial the line next to each item to release ALL health information INCLUDING  _____ Care and treatment for drug and / or alcohol abuse  _____ HIV testing, infection status, or AIDS  _____ Genetic Testing    DATES OF SERVICE OR TIME PERIOD TO BE DISCLOSED: _____________  I understand and acknowledge that:  * This Authorization may be revoked at any time by you in writing, except if your health information has already been used or disclosed.  * Your health information that will be used or disclosed as a result of you signing this authorization could be re-disclosed by the recipient. If this occurs, your re-disclosed health information may no longer be protected by State or Federal laws.  * You may refuse to sign this Authorization. Your refusal will not affect your ability to obtain treatment.  * This Authorization becomes effective upon signing and will  on (date) __________.      If no date is indicated, this Authorization will  one (1) year from the signature date.    Name: David Farias  Rojelio    Signature:   Date:     6/3/2022       PLEASE FAX REQUESTED RECORDS BACK TO: [phone]

## 2022-06-17 ENCOUNTER — HOSPITAL ENCOUNTER (OUTPATIENT)
Dept: LAB | Facility: MEDICAL CENTER | Age: 57
End: 2022-06-17
Attending: FAMILY MEDICINE
Payer: COMMERCIAL

## 2022-06-17 DIAGNOSIS — E55.9 VITAMIN D DEFICIENCY: ICD-10-CM

## 2022-06-17 DIAGNOSIS — Z13.6 ENCOUNTER FOR SCREENING FOR CARDIOVASCULAR DISORDERS: ICD-10-CM

## 2022-06-17 LAB
25(OH)D3 SERPL-MCNC: 66 NG/ML (ref 30–100)
ALBUMIN SERPL BCP-MCNC: 4.4 G/DL (ref 3.2–4.9)
ALBUMIN/GLOB SERPL: 1.5 G/DL
ALP SERPL-CCNC: 58 U/L (ref 30–99)
ALT SERPL-CCNC: 16 U/L (ref 2–50)
ANION GAP SERPL CALC-SCNC: 11 MMOL/L (ref 7–16)
AST SERPL-CCNC: 21 U/L (ref 12–45)
BASOPHILS # BLD AUTO: 1.3 % (ref 0–1.8)
BASOPHILS # BLD: 0.06 K/UL (ref 0–0.12)
BILIRUB SERPL-MCNC: 0.5 MG/DL (ref 0.1–1.5)
BUN SERPL-MCNC: 21 MG/DL (ref 8–22)
CALCIUM SERPL-MCNC: 9.6 MG/DL (ref 8.5–10.5)
CHLORIDE SERPL-SCNC: 103 MMOL/L (ref 96–112)
CHOLEST SERPL-MCNC: 147 MG/DL (ref 100–199)
CO2 SERPL-SCNC: 26 MMOL/L (ref 20–33)
CREAT SERPL-MCNC: 0.73 MG/DL (ref 0.5–1.4)
EOSINOPHIL # BLD AUTO: 0.1 K/UL (ref 0–0.51)
EOSINOPHIL NFR BLD: 2.2 % (ref 0–6.9)
ERYTHROCYTE [DISTWIDTH] IN BLOOD BY AUTOMATED COUNT: 40.6 FL (ref 35.9–50)
EST. AVERAGE GLUCOSE BLD GHB EST-MCNC: 100 MG/DL
FASTING STATUS PATIENT QL REPORTED: NORMAL
GFR SERPLBLD CREATININE-BSD FMLA CKD-EPI: 97 ML/MIN/1.73 M 2
GLOBULIN SER CALC-MCNC: 3 G/DL (ref 1.9–3.5)
GLUCOSE SERPL-MCNC: 91 MG/DL (ref 65–99)
HBA1C MFR BLD: 5.1 % (ref 4–5.6)
HCT VFR BLD AUTO: 45.6 % (ref 37–47)
HDLC SERPL-MCNC: 61 MG/DL
HGB BLD-MCNC: 15 G/DL (ref 12–16)
IMM GRANULOCYTES # BLD AUTO: 0.01 K/UL (ref 0–0.11)
IMM GRANULOCYTES NFR BLD AUTO: 0.2 % (ref 0–0.9)
LDLC SERPL CALC-MCNC: 72 MG/DL
LYMPHOCYTES # BLD AUTO: 1.78 K/UL (ref 1–4.8)
LYMPHOCYTES NFR BLD: 38.3 % (ref 22–41)
MCH RBC QN AUTO: 28.4 PG (ref 27–33)
MCHC RBC AUTO-ENTMCNC: 32.9 G/DL (ref 33.6–35)
MCV RBC AUTO: 86.4 FL (ref 81.4–97.8)
MONOCYTES # BLD AUTO: 0.3 K/UL (ref 0–0.85)
MONOCYTES NFR BLD AUTO: 6.5 % (ref 0–13.4)
NEUTROPHILS # BLD AUTO: 2.4 K/UL (ref 2–7.15)
NEUTROPHILS NFR BLD: 51.5 % (ref 44–72)
NRBC # BLD AUTO: 0 K/UL
NRBC BLD-RTO: 0 /100 WBC
PLATELET # BLD AUTO: 383 K/UL (ref 164–446)
PMV BLD AUTO: 9.8 FL (ref 9–12.9)
POTASSIUM SERPL-SCNC: 4.6 MMOL/L (ref 3.6–5.5)
PROT SERPL-MCNC: 7.4 G/DL (ref 6–8.2)
RBC # BLD AUTO: 5.28 M/UL (ref 4.2–5.4)
SODIUM SERPL-SCNC: 140 MMOL/L (ref 135–145)
TRIGL SERPL-MCNC: 72 MG/DL (ref 0–149)
TSH SERPL DL<=0.005 MIU/L-ACNC: 1.82 UIU/ML (ref 0.38–5.33)
WBC # BLD AUTO: 4.7 K/UL (ref 4.8–10.8)

## 2022-06-17 PROCEDURE — 80061 LIPID PANEL: CPT

## 2022-06-17 PROCEDURE — 85025 COMPLETE CBC W/AUTO DIFF WBC: CPT

## 2022-06-17 PROCEDURE — 83036 HEMOGLOBIN GLYCOSYLATED A1C: CPT

## 2022-06-17 PROCEDURE — 80053 COMPREHEN METABOLIC PANEL: CPT

## 2022-06-17 PROCEDURE — 36415 COLL VENOUS BLD VENIPUNCTURE: CPT

## 2022-06-17 PROCEDURE — 82306 VITAMIN D 25 HYDROXY: CPT

## 2022-06-17 PROCEDURE — 84443 ASSAY THYROID STIM HORMONE: CPT

## 2022-06-20 ENCOUNTER — APPOINTMENT (RX ONLY)
Dept: URBAN - METROPOLITAN AREA CLINIC 20 | Facility: CLINIC | Age: 57
Setting detail: DERMATOLOGY
End: 2022-06-20

## 2022-06-20 DIAGNOSIS — Z41.9 ENCOUNTER FOR PROCEDURE FOR PURPOSES OTHER THAN REMEDYING HEALTH STATE, UNSPECIFIED: ICD-10-CM

## 2022-06-20 PROCEDURE — ? FACIAL

## 2022-06-20 ASSESSMENT — LOCATION SIMPLE DESCRIPTION DERM
LOCATION SIMPLE: LEFT FOREHEAD
LOCATION SIMPLE: LEFT CHEEK

## 2022-06-20 ASSESSMENT — LOCATION DETAILED DESCRIPTION DERM
LOCATION DETAILED: LEFT MEDIAL FOREHEAD
LOCATION DETAILED: LEFT INFERIOR CENTRAL MALAR CHEEK

## 2022-06-20 ASSESSMENT — LOCATION ZONE DERM: LOCATION ZONE: FACE

## 2022-06-20 NOTE — PROCEDURE: FACIAL
Detail Level: Detailed
Extraction Method: cotton-tipped applicator
Exfoliation Type: dermaplane
Price (Use Numbers Only, No Special Characters Or $): 140.00

## 2022-07-21 ENCOUNTER — APPOINTMENT (RX ONLY)
Dept: URBAN - METROPOLITAN AREA CLINIC 20 | Facility: CLINIC | Age: 57
Setting detail: DERMATOLOGY
End: 2022-07-21

## 2022-07-21 DIAGNOSIS — Z41.9 ENCOUNTER FOR PROCEDURE FOR PURPOSES OTHER THAN REMEDYING HEALTH STATE, UNSPECIFIED: ICD-10-CM

## 2022-07-21 PROCEDURE — ? HYDRAFACIAL

## 2022-07-21 ASSESSMENT — LOCATION SIMPLE DESCRIPTION DERM
LOCATION SIMPLE: RIGHT FOREHEAD
LOCATION SIMPLE: LEFT CHEEK

## 2022-07-21 ASSESSMENT — LOCATION DETAILED DESCRIPTION DERM
LOCATION DETAILED: LEFT INFERIOR MEDIAL MALAR CHEEK
LOCATION DETAILED: RIGHT MEDIAL FOREHEAD

## 2022-07-21 ASSESSMENT — LOCATION ZONE DERM: LOCATION ZONE: FACE

## 2022-08-04 ENCOUNTER — HOSPITAL ENCOUNTER (OUTPATIENT)
Dept: RADIOLOGY | Facility: MEDICAL CENTER | Age: 57
End: 2022-08-04
Attending: CLINICAL NURSE SPECIALIST
Payer: COMMERCIAL

## 2022-08-04 DIAGNOSIS — K21.9 GASTROESOPHAGEAL REFLUX DISEASE, UNSPECIFIED WHETHER ESOPHAGITIS PRESENT: ICD-10-CM

## 2022-08-04 PROCEDURE — 74240 X-RAY XM UPR GI TRC 1CNTRST: CPT

## 2022-08-24 ENCOUNTER — PRE-ADMISSION TESTING (OUTPATIENT)
Dept: ADMISSIONS | Facility: MEDICAL CENTER | Age: 57
End: 2022-08-24
Attending: SURGERY
Payer: COMMERCIAL

## 2022-08-24 DIAGNOSIS — Z01.812 PRE-OPERATIVE LABORATORY EXAMINATION: ICD-10-CM

## 2022-08-24 LAB
ANION GAP SERPL CALC-SCNC: 12 MMOL/L (ref 7–16)
BUN SERPL-MCNC: 24 MG/DL (ref 8–22)
CALCIUM SERPL-MCNC: 9.7 MG/DL (ref 8.5–10.5)
CHLORIDE SERPL-SCNC: 102 MMOL/L (ref 96–112)
CO2 SERPL-SCNC: 25 MMOL/L (ref 20–33)
CREAT SERPL-MCNC: 0.58 MG/DL (ref 0.5–1.4)
ERYTHROCYTE [DISTWIDTH] IN BLOOD BY AUTOMATED COUNT: 39.6 FL (ref 35.9–50)
GFR SERPLBLD CREATININE-BSD FMLA CKD-EPI: 106 ML/MIN/1.73 M 2
GLUCOSE SERPL-MCNC: 88 MG/DL (ref 65–99)
HCT VFR BLD AUTO: 44.4 % (ref 37–47)
HGB BLD-MCNC: 15.1 G/DL (ref 12–16)
MCH RBC QN AUTO: 29.4 PG (ref 27–33)
MCHC RBC AUTO-ENTMCNC: 34 G/DL (ref 33.6–35)
MCV RBC AUTO: 86.5 FL (ref 81.4–97.8)
PLATELET # BLD AUTO: 403 K/UL (ref 164–446)
PMV BLD AUTO: 9.4 FL (ref 9–12.9)
POTASSIUM SERPL-SCNC: 4 MMOL/L (ref 3.6–5.5)
RBC # BLD AUTO: 5.13 M/UL (ref 4.2–5.4)
SODIUM SERPL-SCNC: 139 MMOL/L (ref 135–145)
WBC # BLD AUTO: 4.9 K/UL (ref 4.8–10.8)

## 2022-08-24 PROCEDURE — 85027 COMPLETE CBC AUTOMATED: CPT

## 2022-08-24 PROCEDURE — 36415 COLL VENOUS BLD VENIPUNCTURE: CPT

## 2022-08-24 PROCEDURE — 80048 BASIC METABOLIC PNL TOTAL CA: CPT

## 2022-08-24 ASSESSMENT — FIBROSIS 4 INDEX: FIB4 SCORE: 0.75

## 2022-08-25 ENCOUNTER — OFFICE VISIT (OUTPATIENT)
Dept: MEDICAL GROUP | Facility: LAB | Age: 57
End: 2022-08-25
Payer: COMMERCIAL

## 2022-08-25 ENCOUNTER — HOSPITAL ENCOUNTER (OUTPATIENT)
Dept: RADIOLOGY | Facility: MEDICAL CENTER | Age: 57
End: 2022-08-25
Attending: FAMILY MEDICINE
Payer: COMMERCIAL

## 2022-08-25 VITALS
OXYGEN SATURATION: 97 % | HEART RATE: 61 BPM | HEIGHT: 66 IN | RESPIRATION RATE: 16 BRPM | SYSTOLIC BLOOD PRESSURE: 100 MMHG | WEIGHT: 173 LBS | TEMPERATURE: 97.4 F | BODY MASS INDEX: 27.8 KG/M2 | DIASTOLIC BLOOD PRESSURE: 64 MMHG

## 2022-08-25 DIAGNOSIS — M21.371 RIGHT FOOT DROP: ICD-10-CM

## 2022-08-25 DIAGNOSIS — L98.7 EXCESS SKIN OF BREAST: ICD-10-CM

## 2022-08-25 PROCEDURE — 72100 X-RAY EXAM L-S SPINE 2/3 VWS: CPT

## 2022-08-25 PROCEDURE — 99214 OFFICE O/P EST MOD 30 MIN: CPT | Performed by: FAMILY MEDICINE

## 2022-08-25 ASSESSMENT — FIBROSIS 4 INDEX: FIB4 SCORE: 0.72

## 2022-08-25 NOTE — PROGRESS NOTES
"Subjective:     Chief Complaint   Patient presents with    Follow-Up     Numbness down right leg into foot x4-6wks          HPI:   Romina presents today with right leg and foot numbness and tingling. Feels like pins and needles. Feels like her foot is flapping a bit with walking as well.     Did pull a muscle in her back prior to this, right back, hip etc. Using pilates reformer after her  used it so a lot more weight was on it. Felt a pop.     No chiro. Has done acupuncture in the past.           Current Outpatient Medications Ordered in Epic   Medication Sig Dispense Refill    loratadine (CLARITIN) 10 MG Tab Take 10 mg by mouth every day.      Fexofenadine HCl (MUCINEX ALLERGY PO) Take  by mouth.      Multiple Vitamin (ONE-A-DAY ESSENTIAL PO) Take  by mouth.      Ascorbic Acid (VITAMIN C PO) Take  by mouth.      minoxidil (LONITEN) 2.5 MG Tab Take 1.25 mg by mouth every day.      XIIDRA 5 % Solution INSTILL 1 DROP INTO BOTH EYES TWICE A DAY      cyclosporin (RESTASIS) 0.05 % ophthalmic emulsion Administer 1 Drop into both eyes 2 times a day.       No current [facility]-ordered facility-administered medications on file.         ROS:  Gen: no fevers/chills, no changes in weight  Eyes: no changes in vision  ENT: no sore throat, no hearing loss, no bloody nose  Pulm: no sob, no cough  CV: no chest pain, no palpitations  GI: no nausea/vomiting, no diarrhea  : no dysuria  MSk: no myalgias  Skin: no rash  Neuro: no headaches, no numbness/tingling  Heme/Lymph: no easy bruising      Objective:     Exam:  /64   Pulse 61   Temp 36.3 °C (97.4 °F) (Temporal)   Resp 16   Ht 1.676 m (5' 6\")   Wt 78.5 kg (173 lb)   LMP  (LMP Unknown)   SpO2 97%   BMI 27.92 kg/m²  Body mass index is 27.92 kg/m².    Gen: AAOx3, NAD, well appearing  HEENT: NCAT, EOMI, Nares patent, Mucosa moist  Resp: Normal chest wall rise and fall, not SOB, no tachypnea  Skin: no rash or abnormality of visible skin.   Psych: normal speech, not " slurred, good insight, affect full  MSK: Moves all four limbs equally and normally, mild pelvic obliquity noted but with good forward flexion and good extension.  Mild tenderness to right paraspinals as well as SI joint but also tenderness going down her right IT band.  She has normal quad strength and function normal hamstring strength and function but cannot completely dorsiflex her right ankle even against gravity.  Certainly there is no ability to do this against any resistance.  She does have normal plantar flexion on this side.  Concern for dropfoot.        Assessment & Plan:     57 y.o. female with the following -   1. Right foot drop  Discussed concern for dropfoot on the right side.  She does note that when she is tired this seems worse but when she is exercising it seems to be a little bit better.  She cannot dorsiflex her right ankle even against gravity which is also concerning.  We will start with plain x-rays which she will get done today but then get her MRI scheduled as soon as possible.  We did discuss concern for a disc pinching a nerve which is causing this and what might need to be interventional treatment to improve this.  We did discuss that this can get worse with time and potentially increase risks for not ever getting better.  - DX-LUMBAR SPINE-2 OR 3 VIEWS; Future  - MR-LUMBAR SPINE-W/O; Future    2. Excess skin of breast  Would like to undergo breast reduction after all of her weight loss.  Has excess skin and would like to get the process started with plastic surgery referral.  - Referral to Plastic Surgery            No follow-ups on file.    Please note that this dictation was created using voice recognition software. I have made every reasonable attempt to correct obvious errors, but I expect that there are errors of grammar and possibly content that I did not discover before finalizing the note.

## 2022-09-01 ENCOUNTER — ANESTHESIA EVENT (OUTPATIENT)
Dept: SURGERY | Facility: MEDICAL CENTER | Age: 57
End: 2022-09-01
Payer: COMMERCIAL

## 2022-09-01 ENCOUNTER — ANESTHESIA (OUTPATIENT)
Dept: SURGERY | Facility: MEDICAL CENTER | Age: 57
End: 2022-09-01
Payer: COMMERCIAL

## 2022-09-01 ENCOUNTER — HOSPITAL ENCOUNTER (OUTPATIENT)
Facility: MEDICAL CENTER | Age: 57
End: 2022-09-01
Attending: SURGERY | Admitting: SURGERY
Payer: COMMERCIAL

## 2022-09-01 VITALS
SYSTOLIC BLOOD PRESSURE: 117 MMHG | OXYGEN SATURATION: 95 % | WEIGHT: 172.84 LBS | HEART RATE: 55 BPM | DIASTOLIC BLOOD PRESSURE: 68 MMHG | BODY MASS INDEX: 28.8 KG/M2 | RESPIRATION RATE: 19 BRPM | HEIGHT: 65 IN | TEMPERATURE: 97 F

## 2022-09-01 DIAGNOSIS — G89.18 POSTOPERATIVE PAIN: ICD-10-CM

## 2022-09-01 LAB — PATHOLOGY CONSULT NOTE: NORMAL

## 2022-09-01 PROCEDURE — 160041 HCHG SURGERY MINUTES - EA ADDL 1 MIN LEVEL 4: Performed by: SURGERY

## 2022-09-01 PROCEDURE — 160035 HCHG PACU - 1ST 60 MINS PHASE I: Performed by: SURGERY

## 2022-09-01 PROCEDURE — 700101 HCHG RX REV CODE 250: Performed by: SURGERY

## 2022-09-01 PROCEDURE — 160046 HCHG PACU - 1ST 60 MINS PHASE II: Performed by: SURGERY

## 2022-09-01 PROCEDURE — 700111 HCHG RX REV CODE 636 W/ 250 OVERRIDE (IP): Performed by: SURGERY

## 2022-09-01 PROCEDURE — 700102 HCHG RX REV CODE 250 W/ 637 OVERRIDE(OP): Performed by: SURGERY

## 2022-09-01 PROCEDURE — 160002 HCHG RECOVERY MINUTES (STAT): Performed by: SURGERY

## 2022-09-01 PROCEDURE — 160025 RECOVERY II MINUTES (STATS): Performed by: SURGERY

## 2022-09-01 PROCEDURE — 160009 HCHG ANES TIME/MIN: Performed by: SURGERY

## 2022-09-01 PROCEDURE — 700111 HCHG RX REV CODE 636 W/ 250 OVERRIDE (IP): Performed by: STUDENT IN AN ORGANIZED HEALTH CARE EDUCATION/TRAINING PROGRAM

## 2022-09-01 PROCEDURE — 160029 HCHG SURGERY MINUTES - 1ST 30 MINS LEVEL 4: Performed by: SURGERY

## 2022-09-01 PROCEDURE — 700105 HCHG RX REV CODE 258: Performed by: SURGERY

## 2022-09-01 PROCEDURE — 00797 ANES IPER UPR ABD GSTR PX MO: CPT | Performed by: STUDENT IN AN ORGANIZED HEALTH CARE EDUCATION/TRAINING PROGRAM

## 2022-09-01 PROCEDURE — 160048 HCHG OR STATISTICAL LEVEL 1-5: Performed by: SURGERY

## 2022-09-01 PROCEDURE — A9270 NON-COVERED ITEM OR SERVICE: HCPCS | Performed by: SURGERY

## 2022-09-01 PROCEDURE — 88300 SURGICAL PATH GROSS: CPT

## 2022-09-01 PROCEDURE — 700101 HCHG RX REV CODE 250: Performed by: STUDENT IN AN ORGANIZED HEALTH CARE EDUCATION/TRAINING PROGRAM

## 2022-09-01 RX ORDER — HYDROMORPHONE HYDROCHLORIDE 1 MG/ML
0.4 INJECTION, SOLUTION INTRAMUSCULAR; INTRAVENOUS; SUBCUTANEOUS
Status: DISCONTINUED | OUTPATIENT
Start: 2022-09-01 | End: 2022-09-01 | Stop reason: HOSPADM

## 2022-09-01 RX ORDER — OXYCODONE HCL 5 MG/5 ML
10 SOLUTION, ORAL ORAL
Status: DISCONTINUED | OUTPATIENT
Start: 2022-09-01 | End: 2022-09-01 | Stop reason: HOSPADM

## 2022-09-01 RX ORDER — ROCURONIUM BROMIDE 10 MG/ML
INJECTION, SOLUTION INTRAVENOUS PRN
Status: DISCONTINUED | OUTPATIENT
Start: 2022-09-01 | End: 2022-09-01 | Stop reason: SURG

## 2022-09-01 RX ORDER — ACETAMINOPHEN 325 MG/1
650 TABLET ORAL EVERY 6 HOURS
Qty: 60 TABLET | Refills: 0 | Status: SHIPPED | OUTPATIENT
Start: 2022-09-01 | End: 2022-10-03

## 2022-09-01 RX ORDER — OMEPRAZOLE 20 MG/1
20 CAPSULE, DELAYED RELEASE ORAL DAILY
Qty: 30 CAPSULE | Refills: 11 | Status: SHIPPED | OUTPATIENT
Start: 2022-09-01 | End: 2022-10-03

## 2022-09-01 RX ORDER — HALOPERIDOL 5 MG/ML
1 INJECTION INTRAMUSCULAR
Status: DISCONTINUED | OUTPATIENT
Start: 2022-09-01 | End: 2022-09-01 | Stop reason: HOSPADM

## 2022-09-01 RX ORDER — DIPHENHYDRAMINE HYDROCHLORIDE 50 MG/ML
12.5 INJECTION INTRAMUSCULAR; INTRAVENOUS
Status: DISCONTINUED | OUTPATIENT
Start: 2022-09-01 | End: 2022-09-01 | Stop reason: HOSPADM

## 2022-09-01 RX ORDER — SODIUM CHLORIDE, SODIUM LACTATE, POTASSIUM CHLORIDE, CALCIUM CHLORIDE 600; 310; 30; 20 MG/100ML; MG/100ML; MG/100ML; MG/100ML
INJECTION, SOLUTION INTRAVENOUS CONTINUOUS
Status: DISCONTINUED | OUTPATIENT
Start: 2022-09-01 | End: 2022-09-01 | Stop reason: HOSPADM

## 2022-09-01 RX ORDER — ALBUTEROL SULFATE 2.5 MG/3ML
2.5 SOLUTION RESPIRATORY (INHALATION)
Status: DISCONTINUED | OUTPATIENT
Start: 2022-09-01 | End: 2022-09-01 | Stop reason: HOSPADM

## 2022-09-01 RX ORDER — KETOROLAC TROMETHAMINE 30 MG/ML
INJECTION, SOLUTION INTRAMUSCULAR; INTRAVENOUS PRN
Status: DISCONTINUED | OUTPATIENT
Start: 2022-09-01 | End: 2022-09-01 | Stop reason: SURG

## 2022-09-01 RX ORDER — BUPIVACAINE HYDROCHLORIDE AND EPINEPHRINE 5; 5 MG/ML; UG/ML
INJECTION, SOLUTION PERINEURAL
Status: DISCONTINUED | OUTPATIENT
Start: 2022-09-01 | End: 2022-09-01 | Stop reason: HOSPADM

## 2022-09-01 RX ORDER — METOPROLOL TARTRATE 1 MG/ML
1 INJECTION, SOLUTION INTRAVENOUS
Status: DISCONTINUED | OUTPATIENT
Start: 2022-09-01 | End: 2022-09-01 | Stop reason: HOSPADM

## 2022-09-01 RX ORDER — SUCCINYLCHOLINE CHLORIDE 20 MG/ML
INJECTION INTRAMUSCULAR; INTRAVENOUS PRN
Status: DISCONTINUED | OUTPATIENT
Start: 2022-09-01 | End: 2022-09-01 | Stop reason: SURG

## 2022-09-01 RX ORDER — HYDRALAZINE HYDROCHLORIDE 20 MG/ML
5 INJECTION INTRAMUSCULAR; INTRAVENOUS
Status: DISCONTINUED | OUTPATIENT
Start: 2022-09-01 | End: 2022-09-01 | Stop reason: HOSPADM

## 2022-09-01 RX ORDER — MEPERIDINE HYDROCHLORIDE 25 MG/ML
12.5 INJECTION INTRAMUSCULAR; INTRAVENOUS; SUBCUTANEOUS
Status: DISCONTINUED | OUTPATIENT
Start: 2022-09-01 | End: 2022-09-01 | Stop reason: HOSPADM

## 2022-09-01 RX ORDER — ACETAMINOPHEN 10 MG/ML
1 INJECTION, SOLUTION INTRAVENOUS ONCE
Status: COMPLETED | OUTPATIENT
Start: 2022-09-01 | End: 2022-09-01

## 2022-09-01 RX ORDER — OXYCODONE HCL 5 MG/5 ML
5 SOLUTION, ORAL ORAL
Status: DISCONTINUED | OUTPATIENT
Start: 2022-09-01 | End: 2022-09-01 | Stop reason: HOSPADM

## 2022-09-01 RX ORDER — DEXAMETHASONE SODIUM PHOSPHATE 4 MG/ML
INJECTION, SOLUTION INTRA-ARTICULAR; INTRALESIONAL; INTRAMUSCULAR; INTRAVENOUS; SOFT TISSUE PRN
Status: DISCONTINUED | OUTPATIENT
Start: 2022-09-01 | End: 2022-09-01 | Stop reason: SURG

## 2022-09-01 RX ORDER — MIDAZOLAM HYDROCHLORIDE 1 MG/ML
1 INJECTION INTRAMUSCULAR; INTRAVENOUS
Status: DISCONTINUED | OUTPATIENT
Start: 2022-09-01 | End: 2022-09-01 | Stop reason: HOSPADM

## 2022-09-01 RX ORDER — HYDROMORPHONE HYDROCHLORIDE 1 MG/ML
0.1 INJECTION, SOLUTION INTRAMUSCULAR; INTRAVENOUS; SUBCUTANEOUS
Status: DISCONTINUED | OUTPATIENT
Start: 2022-09-01 | End: 2022-09-01 | Stop reason: HOSPADM

## 2022-09-01 RX ORDER — DIPHENHYDRAMINE HYDROCHLORIDE 50 MG/ML
25 INJECTION INTRAMUSCULAR; INTRAVENOUS EVERY 6 HOURS PRN
Status: DISCONTINUED | OUTPATIENT
Start: 2022-09-01 | End: 2022-09-01 | Stop reason: HOSPADM

## 2022-09-01 RX ORDER — ONDANSETRON 2 MG/ML
INJECTION INTRAMUSCULAR; INTRAVENOUS PRN
Status: DISCONTINUED | OUTPATIENT
Start: 2022-09-01 | End: 2022-09-01 | Stop reason: SURG

## 2022-09-01 RX ORDER — HYDROMORPHONE HYDROCHLORIDE 1 MG/ML
0.2 INJECTION, SOLUTION INTRAMUSCULAR; INTRAVENOUS; SUBCUTANEOUS
Status: DISCONTINUED | OUTPATIENT
Start: 2022-09-01 | End: 2022-09-01 | Stop reason: HOSPADM

## 2022-09-01 RX ORDER — LIDOCAINE HYDROCHLORIDE 20 MG/ML
INJECTION, SOLUTION EPIDURAL; INFILTRATION; INTRACAUDAL; PERINEURAL PRN
Status: DISCONTINUED | OUTPATIENT
Start: 2022-09-01 | End: 2022-09-01 | Stop reason: SURG

## 2022-09-01 RX ORDER — DIPHENHYDRAMINE HCL 25 MG
25 TABLET ORAL EVERY 6 HOURS PRN
Status: DISCONTINUED | OUTPATIENT
Start: 2022-09-01 | End: 2022-09-01 | Stop reason: HOSPADM

## 2022-09-01 RX ORDER — IBUPROFEN 600 MG/1
600 TABLET ORAL EVERY 6 HOURS
Qty: 45 TABLET | Refills: 0 | Status: SHIPPED | OUTPATIENT
Start: 2022-09-01 | End: 2022-10-03

## 2022-09-01 RX ORDER — ONDANSETRON 4 MG/1
4 TABLET, ORALLY DISINTEGRATING ORAL EVERY 6 HOURS PRN
Qty: 18 TABLET | Refills: 0 | Status: SHIPPED | OUTPATIENT
Start: 2022-09-01 | End: 2022-10-03

## 2022-09-01 RX ORDER — ONDANSETRON 2 MG/ML
4 INJECTION INTRAMUSCULAR; INTRAVENOUS
Status: DISCONTINUED | OUTPATIENT
Start: 2022-09-01 | End: 2022-09-01 | Stop reason: HOSPADM

## 2022-09-01 RX ORDER — SCOLOPAMINE TRANSDERMAL SYSTEM 1 MG/1
1 PATCH, EXTENDED RELEASE TRANSDERMAL
Status: DISCONTINUED | OUTPATIENT
Start: 2022-09-01 | End: 2022-09-01 | Stop reason: HOSPADM

## 2022-09-01 RX ORDER — CEFAZOLIN SODIUM 1 G/3ML
INJECTION, POWDER, FOR SOLUTION INTRAMUSCULAR; INTRAVENOUS PRN
Status: DISCONTINUED | OUTPATIENT
Start: 2022-09-01 | End: 2022-09-01 | Stop reason: SURG

## 2022-09-01 RX ORDER — LABETALOL HYDROCHLORIDE 5 MG/ML
5 INJECTION, SOLUTION INTRAVENOUS
Status: DISCONTINUED | OUTPATIENT
Start: 2022-09-01 | End: 2022-09-01 | Stop reason: HOSPADM

## 2022-09-01 RX ORDER — OXYCODONE HYDROCHLORIDE 5 MG/1
5 TABLET ORAL EVERY 4 HOURS PRN
Qty: 12 TABLET | Refills: 0 | Status: SHIPPED | OUTPATIENT
Start: 2022-09-01 | End: 2022-09-04

## 2022-09-01 RX ORDER — POLYETHYLENE GLYCOL 3350 17 G/17G
17 POWDER, FOR SOLUTION ORAL DAILY
Qty: 20 EACH | Refills: 0 | Status: SHIPPED | OUTPATIENT
Start: 2022-09-01 | End: 2022-10-03

## 2022-09-01 RX ADMIN — SCOPALAMINE 1 PATCH: 1 PATCH, EXTENDED RELEASE TRANSDERMAL at 07:04

## 2022-09-01 RX ADMIN — SUGAMMADEX 200 MG: 100 INJECTION, SOLUTION INTRAVENOUS at 08:01

## 2022-09-01 RX ADMIN — LIDOCAINE HYDROCHLORIDE 100 MG: 20 INJECTION, SOLUTION EPIDURAL; INFILTRATION; INTRACAUDAL at 07:34

## 2022-09-01 RX ADMIN — DEXAMETHASONE SODIUM PHOSPHATE 8 MG: 4 INJECTION, SOLUTION INTRA-ARTICULAR; INTRALESIONAL; INTRAMUSCULAR; INTRAVENOUS; SOFT TISSUE at 07:37

## 2022-09-01 RX ADMIN — FENTANYL CITRATE 100 MCG: 50 INJECTION, SOLUTION INTRAMUSCULAR; INTRAVENOUS at 07:34

## 2022-09-01 RX ADMIN — SODIUM CHLORIDE, POTASSIUM CHLORIDE, SODIUM LACTATE AND CALCIUM CHLORIDE: 600; 310; 30; 20 INJECTION, SOLUTION INTRAVENOUS at 07:05

## 2022-09-01 RX ADMIN — SUCCINYLCHOLINE CHLORIDE 100 MG: 20 INJECTION, SOLUTION INTRAMUSCULAR; INTRAVENOUS; PARENTERAL at 07:34

## 2022-09-01 RX ADMIN — PROPOFOL 100 MG: 10 INJECTION, EMULSION INTRAVENOUS at 07:34

## 2022-09-01 RX ADMIN — ROCURONIUM BROMIDE 40 MG: 10 INJECTION, SOLUTION INTRAVENOUS at 07:40

## 2022-09-01 RX ADMIN — ACETAMINOPHEN 1 G: 10 INJECTION INTRAVENOUS at 07:05

## 2022-09-01 RX ADMIN — CEFAZOLIN 2 G: 330 INJECTION, POWDER, FOR SOLUTION INTRAMUSCULAR; INTRAVENOUS at 07:39

## 2022-09-01 RX ADMIN — ONDANSETRON 4 MG: 2 INJECTION INTRAMUSCULAR; INTRAVENOUS at 07:54

## 2022-09-01 RX ADMIN — KETOROLAC TROMETHAMINE 30 MG: 30 INJECTION, SOLUTION INTRAMUSCULAR at 07:54

## 2022-09-01 RX ADMIN — EPHEDRINE SULFATE 5 MG: 50 INJECTION INTRAMUSCULAR; INTRAVENOUS; SUBCUTANEOUS at 07:50

## 2022-09-01 ASSESSMENT — PAIN SCALES - GENERAL: PAIN_LEVEL: 0

## 2022-09-01 ASSESSMENT — FIBROSIS 4 INDEX: FIB4 SCORE: 0.72

## 2022-09-01 NOTE — DISCHARGE INSTRUCTIONS
HOME CARE INSTRUCTIONS    ACTIVITY: Rest and take it easy for the first 24 hours.  A responsible adult is recommended to remain with you during that time.  It is normal to feel sleepy.  We encourage you to not do anything that requires balance, judgment or coordination.    FOR 24 HOURS DO NOT:  Drive, operate machinery or run household appliances.  Drink beer or alcoholic beverages.  Make important decisions or sign legal documents.    SPECIAL INSTRUCTIONS: D/C instructions:    DIET: Upon discharge from the hospital start with fulls and advance as tolerated after 3 days.     ACTIVITIES: After discharge from the hospital, you may resume full routine activities. However, there should be no heavy lifting (greater than 15 pounds) and no strenuous activities until after your follow-up visit. Otherwise, routine activities of daily living are acceptable.    DRIVING: If you drive, you may drive whenever you are off pain medications and are able to perform the activities needed to drive, i.e. turning, bending, twisting, etc.    BATHING: You may get the wound wet at any time after leaving the hospital. You may shower, but do not submerge in a bath for at least a week. Dressings may come off after 48 hours.    PAIN MEDICATION: You will be given a prescription for pain medication at discharge. Please take these as directed. It is important to remember not to take medications on an empty stomach as this may cause nausea.    CALL IF YOU HAVE: (1) Fevers to more than 1010 F, (2) Unusual chest or leg pain, (3) Drainage or fluid from incision that may be foul smelling, increased tenderness or soreness at the wound or the wound edges are no longer together, redness or swelling at the incision site. Please do not hesitate to call with any other questions.     APPOINTMENT: Contact our office at [phone] for a follow-up appointment in 1 week following your procedure.    If you have any additional questions, please do not hesitate to  call the office.    Office address:   [address], [address] [place] [address]    DIET: To avoid nausea, slowly advance diet as tolerated, avoiding spicy or greasy foods for the first day.  Add more substantial food to your diet according to your physician's instructions.  Babies can be fed formula or breast milk as soon as they are hungry.  INCREASE FLUIDS AND FIBER TO AVOID CONSTIPATION.    MEDICATIONS: Resume taking daily medication.  Take prescribed pain medication with food.  If no medication is prescribed, you may take non-aspirin pain medication if needed.  PAIN MEDICATION CAN BE VERY CONSTIPATING.  Take a stool softener or laxative such as senokot, pericolace, or milk of magnesia if needed.    Prescription given for oxycodone, ibuprofen, omeprazole, zofran, and miralax.     A follow-up appointment should be arranged with your doctor in 1 week with Dr. Alfred; call to schedule.    You should CALL YOUR PHYSICIAN if you develop:  Fever greater than 101 degrees F.  Pain not relieved by medication, or persistent nausea or vomiting.  Excessive bleeding (blood soaking through dressing) or unexpected drainage from the wound.  Extreme redness or swelling around the incision site, drainage of pus or foul smelling drainage.  Inability to urinate or empty your bladder within 8 hours.  Problems with breathing or chest pain.    You should call 911 if you develop problems with breathing or chest pain.  If you are unable to contact your doctor or surgical center, you should go to the nearest emergency room or urgent care center.  Physician's telephone #: [phone]    MILD FLU-LIKE SYMPTOMS ARE NORMAL.  YOU MAY EXPERIENCE GENERALIZED MUSCLE ACHES, THROAT IRRITATION, HEADACHE AND/OR SOME NAUSEA.    If any questions arise, call your doctor.  If your doctor is not available, please feel free to call the Surgical Center at [phone].  The Center is open Monday through Friday from 7AM to 7PM.      A registered nurse may  call you a few days after your surgery to see how you are doing after your procedure.    You may also receive a survey in the mail within the next two weeks and we ask that you take a few moments to complete the survey and return it to us.  Our goal is to provide you with very good care and we value your comments.     Depression / Suicide Risk    As you are discharged from this [facility] facility, it is important to learn how to keep safe from harming yourself.    Recognize the warning signs:  Abrupt changes in personality, positive or negative- including increase in energy   Giving away possessions  Change in eating patterns- significant weight changes-  positive or negative  Change in sleeping patterns- unable to sleep or sleeping all the time   Unwillingness or inability to communicate  Depression  Unusual sadness, discouragement and loneliness  Talk of wanting to die  Neglect of personal appearance   Rebelliousness- reckless behavior  Withdrawal from people/activities they love  Confusion- inability to concentrate     If you or a loved one observes any of these behaviors or has concerns about self-harm, here's what you can do:  Talk about it- your feelings and reasons for harming yourself  Remove any means that you might use to hurt yourself (examples: pills, rope, extension cords, firearm)  Get professional help from the community (Mental Health, Substance Abuse, psychological counseling)  Do not be alone:Call your Safe Contact- someone whom you trust who will be there for you.  Call your local CRISIS HOTLINE 016-5257 or 981-248-2087  Call your local Children's Mobile Crisis Response Team [place] (690) 342-0903 or www.Pixeon  Call the toll free National Suicide Prevention Hotlines   National Suicide Prevention Lifeline 391-785-BHHK (6087)  National Hope Line Network 800-SUICIDE (355-1636)    I acknowledge receipt and understanding of these Home Care instructions.

## 2022-09-01 NOTE — ANESTHESIA PREPROCEDURE EVALUATION
Case: [id number] Date/Time: 09/01/22 0715    Procedure: LAPAROSCOPIC GASTRIC BAND AND PORT REMOVAL (Abdomen)    Anesthesia type: General    Pre-op diagnosis: GASTROESOPHAGEAL REFLUX DISEASE    Location: [place] [address] / SURGERY [facility]    Surgeons: Mook Alfred M.D.          Relevant Problems   NEURO   (positive) H/O nonmelanoma skin cancer       Physical Exam    Airway   Mallampati: II  TM distance: >3 FB  Neck ROM: full       Cardiovascular - normal exam  Rhythm: regular  Rate: normal  (-) murmur     Dental - normal exam           Pulmonary - normal exam  Breath sounds clear to auscultation     Abdominal    Neurological - normal exam                 Anesthesia Plan    ASA 2       Plan - general       Airway plan will be ETT          Induction: intravenous    Postoperative Plan: Postoperative administration of opioids is intended.    Pertinent diagnostic labs and testing reviewed    Informed Consent:    Anesthetic plan and risks discussed with patient.    Use of blood products discussed with: patient whom consented to blood products.

## 2022-09-01 NOTE — OR NURSING
Pt A&OX4. VSS. Pt on room air. Afebrile. Four lap sites to abd, dermabond closure. Ice pack in place. Pt denies pain and declined pain medication. Tolerated sips of water, no complaints of nausea. Scopolamine patch remains in place behind L ear. Pt's , Jason, given update. Report called to DIPTI Antonio. Pt transported to Phase II via [facility].

## 2022-09-01 NOTE — OR NURSING
Assumed care for patient in pre op. Assessment complete, PIV started, Consent signed, questions answered, family at bedside, call light within reach.

## 2022-09-01 NOTE — OP REPORT
Operative Report    Date: 9/1/2022    Surgeon: Mook Alfred M.D.     Assistant: DARRYN Gunderson    Pre-operative Diagnosis: Complications of Lap Band     Post-operative Diagnosis: Same    Procedure: Laparoscopic LAP-BAND Removal    ASA Classification: II.    Indications: This is a 57 y.o. female who presented with symptoms of Morbid Obesity, here for bariatric surgery.    The indications for a surgical assistant in this surgery were indicated due to complexity of the procedure. Their role included aiding in incision, retraction, holding devices including camera for laparoscopic procedure, and closure of the wound.      Findings: band and all components removed    Wound Classification: Class I, I, Clean..    Procedure in detail: The patient was seen and examined in the preoperative holding area.  The risks benefits and alternatives of the procedure were discussed with the patient who wished to proceed with the procedure as described.  The patient was transferred to the operating room placed in supine position and all pressure points were properly padded.  General endotracheal anesthesia was induced and preoperative antibiotics were given per SCIP protocol.  Patient's abdomen was prepped with ChloraPrep and draped in the normal sterile fashion.  A timeout was performed confirming correct patient, correct procedure, and that all necessary equipment was in the room.      We began the procedure by accessing the abdomen.  The 5 mm Optiview port was used to access the abdomen in the epigastric area off midline to the left. Once we entered the abdomen pneumoperitoneum was achieved and maintained at 15 mmHg carbon dioxide throughout the entirety of the case.  Under direct visualization a 5mm and a 12 mm right upper quadrant working port were placed and a 5 mm left upper quadrant working port was placed.      We began the procedure by transecting the LAP-BAND tubing near the LAP-BAND in the stomach with the  ultrasonic device.  We then proceeded with careful dissection of adhesions around the LAP-BAND to identify the clasp of the LAP-BAND.  This was then grasped and opened.  We then carefully remove this from the tunnel of the LAP-BAND and placed this in the left upper quadrant for future retrieval.  We then turned our attention to the fundoplication we performed careful dissection with electrocautery and ultrasonic device to free the adhesions and the fundoplication taking care to avoid the stomach during our dissection.  We then carefully inspected the area of dissection and confirmed hemostasis.  We then grasped the LAP-BAND removed it through the 12 mm port.     The Esha liver retractor was removed under direct visualization.  The remainder of our ports were removed and the pneumoperitoneum was released.      We then turned our attention to removing the port.  This was palpated and subcutaneous area local was injected over the port and we sharply incised the skin a combination of blunt electrocautery dissection was used to dissect the port free from the subcutaneous tissue this was then grasped, elevated, and then dissected free from the attachments to the fascia.  The port was then withdrawn and then carefully inspected to ensure that we had all components of the port removed from the abdomen.  Skin was closed using subcuticular 4-0 Monocryl.  Dermabond was placed over the wounds.    The patient was awakened from general anesthetic, and was taken to the recovery room in stable condition.    Sponge and needle counts were correct at the end of the case.     Specimen: band components sent for gross only    EBL: 10mL    Dispo: stable, extubated, to PACU    Mook Alfred M.D.  [facility]  [phone]

## 2022-09-01 NOTE — ANESTHESIA TIME REPORT
Anesthesia Start and Stop Event Times     Date Time Event    9/1/2022 0720 Ready for Procedure     0731 Anesthesia Start     0819 Anesthesia Stop        Responsible Staff  09/01/22    Name Role Begin End    Fercho Lundberg M.D. Anesth 0731 0819        Overtime Reason:  no overtime (within assigned shift)    Comments:

## 2022-09-01 NOTE — ANESTHESIA PROCEDURE NOTES
Airway    Date/Time: 9/1/2022 7:37 AM  Performed by: Fercho Lundberg M.D.  Authorized by: Fercho Lundberg M.D.     Location:  OR  Urgency:  Elective  Indications for Airway Management:  Anesthesia      Spontaneous Ventilation: absent    Sedation Level:  Deep  Preoxygenated: Yes    Patient Position:  Sniffing  Final Airway Type:  Endotracheal airway  Final Endotracheal Airway:  ETT  Cuffed: Yes    Technique Used for Successful ETT Placement:  Direct laryngoscopy    Insertion Site:  Oral  Blade Type:  Shay  Laryngoscope Blade/Videolaryngoscope Blade Size:  2  ETT Size (mm):  7.0  Measured from:  Teeth  ETT to Teeth (cm):  22  Placement Verified by: auscultation and capnometry    Cormack-Lehane Classification:  Grade IIa - partial view of glottis  Number of Attempts at Approach:  1  Ventilation Between Attempts:  None  Number of Other Approaches Attempted:  0

## 2022-09-01 NOTE — OR NURSING
0875 Report received from DIPTI Quiroz.     0908 Pt arrived to Phase II into room 32. AAOx4. VSS. Denies pain and nausea. Abdominal dressing CDI and soft. Ice pack in place. Belongings returned to pt bedside.    0915 Discharge instructions reviewed with pt and  over the phone. All verbalize understanding and demonstrate teach back. Discharge criteria met. PIV removed. Pt dressed and ambulated to bathroom.     0920 Discharged via wheelchair with CNA escort to responsible adult. All belongings with pt.

## 2022-09-01 NOTE — ANESTHESIA POSTPROCEDURE EVALUATION
Patient: Romina Serrato    Procedure Summary     Date: 09/01/22 Room / Location: [place] [address] / SURGERY [facility]    Anesthesia Start: 0731 Anesthesia Stop: 0819    Procedure: LAPAROSCOPIC GASTRIC BAND AND PORT REMOVAL (Abdomen) Diagnosis: (GASTROESOPHAGEAL REFLUX DISEASE)    Surgeons: Mook Alfred M.D. Responsible Provider: Fercho Lundberg M.D.    Anesthesia Type: general ASA Status: 2          Final Anesthesia Type: general  Last vitals  BP   Blood Pressure: 117/68    Temp   36.1 °C (97 °F)    Pulse   (!) 55   Resp   19    SpO2   95 %      Anesthesia Post Evaluation    Patient location during evaluation: PACU  Patient participation: complete - patient participated  Level of consciousness: awake and alert  Pain score: 0    Airway patency: patent  Anesthetic complications: no  Cardiovascular status: hemodynamically stable  Respiratory status: acceptable  Hydration status: euvolemic    PONV: none          No notable events documented.     Nurse Pain Score: 0 (NPRS)

## 2022-09-06 ENCOUNTER — APPOINTMENT (OUTPATIENT)
Dept: RADIOLOGY | Facility: MEDICAL CENTER | Age: 57
End: 2022-09-06
Attending: FAMILY MEDICINE
Payer: COMMERCIAL

## 2022-09-12 ENCOUNTER — TELEPHONE (OUTPATIENT)
Dept: MEDICAL GROUP | Facility: LAB | Age: 57
End: 2022-09-12
Payer: COMMERCIAL

## 2022-09-12 NOTE — TELEPHONE ENCOUNTER
Pt.LVM stating breast reduction referral does not take Insurance Requesting [place] or [facility]. Or other Ave. to take

## 2022-09-19 DIAGNOSIS — M21.371 RIGHT FOOT DROP: ICD-10-CM

## 2022-09-19 DIAGNOSIS — M54.17 LUMBOSACRAL RADICULOPATHY AT L5: ICD-10-CM

## 2022-09-23 ENCOUNTER — OFFICE VISIT (OUTPATIENT)
Dept: PHYSICAL MEDICINE AND REHAB | Facility: MEDICAL CENTER | Age: 57
End: 2022-09-23
Payer: COMMERCIAL

## 2022-09-23 ENCOUNTER — HOSPITAL ENCOUNTER (OUTPATIENT)
Dept: RADIOLOGY | Facility: MEDICAL CENTER | Age: 57
End: 2022-09-23

## 2022-09-23 VITALS
HEIGHT: 65 IN | BODY MASS INDEX: 28.14 KG/M2 | HEART RATE: 64 BPM | WEIGHT: 168.87 LBS | SYSTOLIC BLOOD PRESSURE: 118 MMHG | OXYGEN SATURATION: 97 % | TEMPERATURE: 97.3 F | DIASTOLIC BLOOD PRESSURE: 70 MMHG

## 2022-09-23 DIAGNOSIS — R20.2 NUMBNESS AND TINGLING OF RIGHT LEG: ICD-10-CM

## 2022-09-23 DIAGNOSIS — M21.371 FOOT DROP, RIGHT: ICD-10-CM

## 2022-09-23 DIAGNOSIS — M54.16 LUMBAR RADICULOPATHY: Primary | ICD-10-CM

## 2022-09-23 DIAGNOSIS — R29.898 RIGHT LEG WEAKNESS: ICD-10-CM

## 2022-09-23 DIAGNOSIS — M62.561 ATROPHY OF CALF MUSCLES ON RIGHT: ICD-10-CM

## 2022-09-23 DIAGNOSIS — R20.0 NUMBNESS AND TINGLING OF RIGHT LEG: ICD-10-CM

## 2022-09-23 PROCEDURE — 99204 OFFICE O/P NEW MOD 45 MIN: CPT | Performed by: STUDENT IN AN ORGANIZED HEALTH CARE EDUCATION/TRAINING PROGRAM

## 2022-09-23 ASSESSMENT — PATIENT HEALTH QUESTIONNAIRE - PHQ9: CLINICAL INTERPRETATION OF PHQ2 SCORE: 0

## 2022-09-23 ASSESSMENT — FIBROSIS 4 INDEX: FIB4 SCORE: 0.72

## 2022-09-23 ASSESSMENT — PAIN SCALES - GENERAL: PAINLEVEL: 4=SLIGHT-MODERATE PAIN

## 2022-09-23 NOTE — H&P (VIEW-ONLY)
New Patient Note    Interventional Pain and Spine  Physiatry (Physical Medicine and Rehabilitation)     Patient Name: Romina Serrato  : 1965  Date of Service: 2022  PCP: Pamela Mason M.D.  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 She is unsure what started this but thinks it could've been from an accident with her Pilates reformer machine in May, a fall onto her right side in May, or a fall onto her right side on  when her dog pulled her down.  After her Pilates reformer machine accident in May, she had severe radiating pain down her posterolateral right glute down her lateral leg to her ankle that lasted approximately 4-6 weeks.      In early July she started noticing numbness radiating from her lateral right knee down to her foot. This is mildly painful. She then noticed slapping of her right foot when walking.  She endorses difficulty wearing heels due to flopping of her right foot and weakness of her right ankle dorsiflexor.  She tripped over her right foot every other day.  Endorses pain that wakes her up from sleep when she rolls onto her right side. Strength and numbness haven't changed in severity since onset.  She denies pain significantly limited by pain.  Pain severity currently 4/10.  Her symptoms do not change with any activity or positioning.    Notes that she has intentionally lost 80 lbs with diet changes since 2022 and now has been crossing her right leg over her left more.  She notices this can exacerbate the numbness and weakness in her leg.     The patient has not tried medications, injections, or physical therapy for this problem.  She denies previous history of this issue.  She does endorse remote history of  right ankle fracture when she was 18 but no residual issues with numbness, ankle stiffness, or weakness.    Medical history includes cataracts, skin cancer    Psychological testing for pain as depression and pain commonly coexist and need to both be treated.     Opioid Risk Score: 0      Interpretation of Opioid Risk Score   Score 0-3 = Low risk of abuse. Do UDS at least once per year.  Score 4-7 = Moderate risk of abuse. Do UDS 1-4 times per year.  Score 8+ = High risk of abuse. Refer to specialist.    PHQ  Depression Screen (PHQ-2/PHQ-9) 6/3/2022 9/23/2022   PHQ-2 Total Score 0 0       Interpretation of PHQ-9 Total Score   Score Severity   1-4 No Depression   5-9 Mild Depression   10-14 Moderate Depression   15-19 Moderately Severe Depression   20-27 Severe Depression      Medical records review:  I reviewed the note from the referring provider Hetal Mason including the note dated 8/25/2022.    ROS:   Red Flags ROS:   Fever, Chills, Sweats: Denies  Involuntary Weight Loss: Denies  Bladder Incontinence: Denies  Bowel Incontinence: denies  Saddle Anesthesia: Denies    All other systems reviewed and negative.     PMHx:   Past Medical History:   Diagnosis Date    Cancer (HCC)        PSHx:   Past Surgical History:   Procedure Laterality Date    LAP, REMOVE ADJUST BOSSMAN RESTRICT D* N/A 9/1/2022    Procedure: LAPAROSCOPIC GASTRIC BAND AND PORT REMOVAL;  Surgeon: Mook Alfred M.D.;  Location: SURGERY [facility];  Service: General    GASTRIC BANDING LAPAROSCOPIC  2012    OTHER ORTHOPEDIC SURGERY  1979    right wrist surgery       Family Hx:   Family History   Problem Relation Age of Onset    Stroke Mother         TIA, carotid endarterectomy    Hypertension Mother     Diabetes Mother     Cancer Father         basal and squamous cell skin    Hyperlipidemia Father     Hypertension Father        Social Hx:  Social History     Socioeconomic History    Marital status:      Spouse name: Not on file    Number  of children: Not on file    Years of education: Not on file    Highest education level: Not on file   Occupational History    Not on file   Tobacco Use    Smoking status: Never    Smokeless tobacco: Never   Vaping Use    Vaping Use: Never used   Substance and Sexual Activity    Alcohol use: Not Currently     Comment: rare    Drug use: Never    Sexual activity: Not on file   Other Topics Concern    Not on file   Social History Narrative    Not on file     Social Determinants of Health     Financial Resource Strain: Not on file   Food Insecurity: Not on file   Transportation Needs: Not on file   Physical Activity: Not on file   Stress: Not on file   Social Connections: Not on file   Intimate Partner Violence: Not on file   Housing Stability: Not on file       Allergies:  Allergies   Allergen Reactions    Clindamycin Anaphylaxis     Throat irritation    Codeine      nausea    Erythromycin      diarrhea    Oxycodone      vomitting      Penicillins Anaphylaxis    Sulfa Drugs Anaphylaxis    Versed      vomiting       Medications: reviewed on epic.   Outpatient Medications Marked as Taking for the 9/23/22 encounter (Office Visit) with Eden Ness M.D.   Medication Sig Dispense Refill    loratadine (CLARITIN) 10 MG Tab Take 10 mg by mouth every day.      Fexofenadine HCl (MUCINEX ALLERGY PO) Take  by mouth.      Multiple Vitamin (ONE-A-DAY ESSENTIAL PO) Take  by mouth.      Ascorbic Acid (VITAMIN C PO) Take  by mouth.      minoxidil (LONITEN) 2.5 MG Tab Take 1.25 mg by mouth every day.      cyclosporin (RESTASIS) 0.05 % ophthalmic emulsion Administer 1 Drop into both eyes 2 times a day.          Current Outpatient Medications on File Prior to Visit   Medication Sig Dispense Refill    loratadine (CLARITIN) 10 MG Tab Take 10 mg by mouth every day.      Fexofenadine HCl (MUCINEX ALLERGY PO) Take  by mouth.      Multiple Vitamin (ONE-A-DAY ESSENTIAL PO) Take  by mouth.      Ascorbic Acid (VITAMIN C PO) Take  by mouth.       "minoxidil (LONITEN) 2.5 MG Tab Take 1.25 mg by mouth every day.      cyclosporin (RESTASIS) 0.05 % ophthalmic emulsion Administer 1 Drop into both eyes 2 times a day.      acetaminophen (TYLENOL) 325 MG Tab Take 2 Tablets by mouth every 6 hours. take scheduled for 3 days post-op then PRN after 60 Tablet 0    omeprazole (PRILOSEC) 20 MG delayed-release capsule Take 1 Capsule by mouth every day. Open capsule and mix in 10cc H2O 30 Capsule 11    polyethylene glycol/lytes (MIRALAX) 17 g Pack Take 1 Packet by mouth every day. While taking narcotics, hold if greater then 3 BMs a day 20 Each 0    ondansetron (ZOFRAN ODT) 4 MG TABLET DISPERSIBLE Take 1 Tablet by mouth every 6 hours as needed for Nausea. 18 Tablet 0    ibuprofen (MOTRIN) 600 MG Tab Take 1 Tablet by mouth every 6 hours. Alternate w/ tylenol to take a medication every 3 hrs, take scheduled for 3 days post-op then PRN 45 Tablet 0     No current facility-administered medications on file prior to visit.         EXAMINATION     Physical Exam:   /70 (BP Location: Right arm, Patient Position: Sitting, BP Cuff Size: Adult)   Pulse 64   Temp 36.3 °C (97.3 °F) (Temporal)   Ht 1.651 m (5' 5\")   Wt 76.6 kg (168 lb 14 oz)   SpO2 97%     Constitutional:   Body Habitus: Body mass index is 28.1 kg/m².  Cooperation: Fully cooperates with exam  Appearance: Well-groomed, well-nourished.    Eyes: No scleral icterus to suggest severe liver disease, no proptosis to suggest severe hyperthyroidism    ENT -no obvious auditory deficits, no noticeable facial droop     Skin -no rashes or lesions noted     Respiratory-  breathing comfortably on room air, no audible wheezing    Cardiovascular-distal extremities warm and well perfused.  No lower extremity edema is noted.     Gastrointestinal - no obvious abdominal masses, non-distended    Psychiatric- alert and oriented ×3. Normal affect.     Gait -steady gait.  Feet clear the ground without difficulty.  Slight difficulty with " heel walking on the right.  No difficulty with toe walking bilaterally.    Musculoskeletal and Neuro -   Negative Tinel's at right fibular head and posterior right knee.  Negative Tinel's at left fibular head.    Thoracic/Lumbar Spine/Sacral Spine/Hips   Inspection: Slight atrophy of her right gastrocnemius muscle compared to the left     There is full active range of motion with lumbar extension    Facet loading maneuver negative bilaterally    Palpation:   No tenderness to palpation over the midline of lumbosacral spine, paraspinal muscles bilaterally, lumbar facets bilaterally, sacroiliac joints bilaterally, PSIS bilaterally, and greater trochanters bilaterally.     Lumbar spine /hip provocative exam maneuvers  Straight leg raise negative bilaterally  FADIR test negative bilaterally    SI joint tests  ANIKA test negative bilaterally    Key points for the international standards for neurological classification of spinal cord injury (ISNCSCI) to light touch.   Dermatome R L   L2 2 2   L3 2 2   L4 1 2   L5 1 2   S1 2 2   S2 2 2       Motor Exam Lower Extremities  ? Myotome R L   Hip flexion L2 5 5   Knee extension L3 5 5   Ankle dorsiflexion L4 4 5   Toe extension L5 4 5   Ankle plantarflexion S1 5 5       Reflexes  ?  R L   Patella  2+ 2+   Achilles   2+ 2+     Clonus of the ankle negative bilaterally       MEDICAL DECISION MAKING    Medical records review: see under HPI section.     DATA    Labs: Personally reviewed at today's visit:     Lab Results   Component Value Date/Time    SODIUM 139 08/24/2022 10:08 AM    POTASSIUM 4.0 08/24/2022 10:08 AM    CHLORIDE 102 08/24/2022 10:08 AM    CO2 25 08/24/2022 10:08 AM    ANION 12.0 08/24/2022 10:08 AM    GLUCOSE 88 08/24/2022 10:08 AM    BUN 24 (H) 08/24/2022 10:08 AM    CREATININE 0.58 08/24/2022 10:08 AM    CALCIUM 9.7 08/24/2022 10:08 AM    ASTSGOT 21 06/17/2022 06:39 AM    ALTSGPT 16 06/17/2022 06:39 AM    TBILIRUBIN 0.5 06/17/2022 06:39 AM    ALBUMIN 4.4 06/17/2022  06:39 AM    TOTPROTEIN 7.4 06/17/2022 06:39 AM    GLOBULIN 3.0 06/17/2022 06:39 AM    AGRATIO 1.5 06/17/2022 06:39 AM       No results found for: PROTHROMBTM, INR     Lab Results   Component Value Date/Time    WBC 4.9 08/24/2022 10:08 AM    RBC 5.13 08/24/2022 10:08 AM    HEMOGLOBIN 15.1 08/24/2022 10:08 AM    HEMATOCRIT 44.4 08/24/2022 10:08 AM    MCV 86.5 08/24/2022 10:08 AM    MCH 29.4 08/24/2022 10:08 AM    MCHC 34.0 08/24/2022 10:08 AM    MPV 9.4 08/24/2022 10:08 AM    NEUTSPOLYS 51.50 06/17/2022 06:39 AM    LYMPHOCYTES 38.30 06/17/2022 06:39 AM    MONOCYTES 6.50 06/17/2022 06:39 AM    EOSINOPHILS 2.20 06/17/2022 06:39 AM    BASOPHILS 1.30 06/17/2022 06:39 AM        Lab Results   Component Value Date/Time    HBA1C 5.1 06/17/2022 06:39 AM        Imaging:   I personally reviewed following images, these are my reads  MRI lumbar spine 9/16/2022  Shallow broad-based disc bulge at L3-4 and L4-5.  At L3-4 there is mild bilateral neuroforaminal stenosis.  At L4-5 there is moderate central canal stenosis and right lateral recess stenosis.  There is bilateral facet arthropathy most notable at L3-4, L4-5, and L5-S1.  There is no significant central canal stenosis or neuroforaminal stenosis elsewhere. See formal radiology report for further details.    IMAGING radiology reads. I reviewed the following radiology reads   MRI lumbar spine 9/16/2022      Results for orders placed during the hospital encounter of 08/25/22    DX-LUMBAR SPINE-2 OR 3 VIEWS    Impression  No acute osseous abnormality.  Mild retrolisthesis at L5-S1, likely degenerative.  Mild to moderate degenerative change of the lumbar spine, most at L5-S1.                         Diagnosis  Visit Diagnoses     ICD-10-CM   1. Lumbar radiculopathy  M54.16   2. Numbness and tingling of right leg  R20.0    R20.2   3. Right ankle dorsiflexor weakness  R29.898   4. Foot drop, right  M21.371   5. Atrophy of calf muscles on right  M62.561         ASSESSMENT AND  PLAN:  Romina Serrato ( 1965) is a female who presents with weakness of her right ankle dorsiflexor and great toe extensors with impaired sensation to light touch in the L4 and L5 distribution, likely consistent with L4 and L5 lumbar radiculopathy. Slight atrophy of her right gastrocnemius muscle compared to the left     She has negative provocative exam testing for peroneal neuropathy on exam.     Romina was seen today for new patient.    Diagnoses and all orders for this visit:    Lumbar radiculopathy  -     Referral to Pain Clinic  -     Referral to Physical Therapy    Numbness and tingling of right leg  -     Referral to Pain Clinic  -     Referral to Physical Therapy    Right ankle dorsiflexor weakness  -     Referral to Physical Therapy    Foot drop, right    Atrophy of calf muscles on right        PLAN  Physical Therapy: I ordered physical therapy to focus on strengthening and stretching as well as a home exercise program.     Diagnostic workup: Personally reviewed at today's visit: MRI lumbar spine 2022    Medications:   - No medication changes needed at this time- Patient denies significant pain     Interventions:   - right L4-5 transforaminal epidural steroid injection. The risks, benefits, and alternatives to this procedure were discussed and the patient wishes to proceed with the procedure. Risks include but are not limited to damage to surrounding structures, infection, bleeding, worsening of pain which can be permanent, and weakness which can be permanent. Benefits include pain relief and improved function. Alternatives include not doing the procedure.      Other  -If no significant improvement after injection above,  can consider EMG/NCS for further evaluation    Follow-up: 3-4 weeks after procedure above    Orders Placed This Encounter    Referral to Pain Clinic    Referral to Physical Therapy       Eden Ness MD  Interventional Pain and Spine  Physical Medicine and  Rehabilitation  [facility]    CC Ramírez Mason*     The above note documents my personal evaluation of this patient. In addition, I have reviewed and confirmed with the patient and MA the supportive information documented in today's Patient Health Questionnaire and Office Note.     Please note that this dictation was created using voice recognition software. I have made every reasonable attempt to correct obvious errors, but I expect that there are errors of grammar and possibly content that I did not discover before finalizing the note.

## 2022-09-23 NOTE — PATIENT INSTRUCTIONS
Your procedure will be at the [facility] special procedure suite.    [facility] [place] [address]       PRE-PROCEDURE INSTRUCTIONS  You may take your regular medications except:   No Anti-inflammatories 5 days prior to your procedure. Anti-inflammatories include medicines such as  ibuprofen (Motrin, Advil), Excedrin, Naproxen (Aleve, Anaprox, Naprelan, Naprosyn), Celecoxib (Celebrex), Diclofenac (Voltaren-XR tab), and Meloxicam (Mobic).   You can take the remainder of your pain medications as prescribed.   If you are having a diagnostic procedure such as a medial branch block, do not use her pain meds on the day of the procedure  No Glucophage or Metformin 24 hours before your procedure. You may resume next day after your procedure.  Call the physiatry office if you are taking or prescribed anti-biotics within five days of procedure.  Please ask provider if you are taking any new diabetes medication.  CONTINUE TAKING BLOOD PRESSURE MEDICATIONS AS PRESCRIBED.  Pain medications will not be prescribed on the procedure day. Procedural pain medication may be used by your provider   Call your doctor's office performing the procedure if you have a fever, chills, rash or new illness prior to your procedure    Anticoagulation/antiplatelet medications  No Blood thinning medications such as Coumadin, Xarelto, aspirin or Plavix 5 days prior to procedure unless your doctor said to continue these medications. Call your doctor if a new medication is prescribed in this class.     Restrictions for eating before procedure:   If you are getting procedural sedation, then do not eat to for 8 hours prior to procedure appointment time. Do not drink fluids for four hours prior to your procedure time.   If you are not having procedural sedation, then Skip the meal prior to your procedure. If you have a morning procedure then skip breakfast. If you have an afternoon procedure then skip lunch.   You may drink clear liquids  up to 2 hours prior to your procedure  You must have a  the day of procedure to accompany you home.      POST PROCEDURE INSTRUCTIONS   No heavy lifting, strenuous bending or strenuous exercise for 3 days after your procedure.  No hot tubs, baths, swimming for 3 days after your procedure  You can remove the bandage the day after the procedure.  IF YOU RECEIVED A STEROID INJECTION. PLEASE NOTE THAT THERE MAY BE A DELAY FOR THE INJECTION TO START WORKING, THE DELAY MAY BE UP TO TWO WEEKS. IF YOU HAVE DIABETES, PLEASE NOTE THAT YOUR SUGAR LEVELS MAY BE ELEVATED FOR 1-2 DAYS AFTER A STEROID INJECTION.  THE STEROID MAY CAUSE TEMPORARY SYMPTOMS WHICH USUALLY RESOLVE ON THEIR OWN WITHIN 1 TO 2 DAYS INCLUDING FACIAL FLUSHING OR A FEELING OF WARMTH ON THE FACE, TEMPORARY INCREASES IN BLOOD SUGAR, INSOMNIA, INCREASED HUNGER  IF YOU EXPERIENCE PROLONGED WEAKNESS LONGER THAN ONE DAY, BOWEL OR BLADDER INCONTINENCE THEN PLEASE CALL THE PHYSIATRY OFFICE.  Your leg may feel heavy, weak and numb for up to 1-2 days. Be very careful walking.    You may resume normal activities 3 days after procedure.

## 2022-09-23 NOTE — PROGRESS NOTES
New Patient Note    Interventional Pain and Spine  Physiatry (Physical Medicine and Rehabilitation)     Patient Name: Romina Serrato  : 1965  Date of Service: 2022  PCP: Pamela Mason M.D.  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 She is unsure what started this but thinks it could've been from an accident with her Pilates reformer machine in May, a fall onto her right side in May, or a fall onto her right side on  when her dog pulled her down.  After her Pilates reformer machine accident in May, she had severe radiating pain down her posterolateral right glute down her lateral leg to her ankle that lasted approximately 4-6 weeks.      In early July she started noticing numbness radiating from her lateral right knee down to her foot. This is mildly painful. She then noticed slapping of her right foot when walking.  She endorses difficulty wearing heels due to flopping of her right foot and weakness of her right ankle dorsiflexor.  She tripped over her right foot every other day.  Endorses pain that wakes her up from sleep when she rolls onto her right side. Strength and numbness haven't changed in severity since onset.  She denies pain significantly limited by pain.  Pain severity currently 4/10.  Her symptoms do not change with any activity or positioning.    Notes that she has intentionally lost 80 lbs with diet changes since 2022 and now has been crossing her right leg over her left more.  She notices this can exacerbate the numbness and weakness in her leg.     The patient has not tried medications, injections, or physical therapy for this problem.  She denies previous history of this issue.  She does endorse remote history of  right ankle fracture when she was 18 but no residual issues with numbness, ankle stiffness, or weakness.    Medical history includes cataracts, skin cancer    Psychological testing for pain as depression and pain commonly coexist and need to both be treated.     Opioid Risk Score: 0      Interpretation of Opioid Risk Score   Score 0-3 = Low risk of abuse. Do UDS at least once per year.  Score 4-7 = Moderate risk of abuse. Do UDS 1-4 times per year.  Score 8+ = High risk of abuse. Refer to specialist.    PHQ  Depression Screen (PHQ-2/PHQ-9) 6/3/2022 9/23/2022   PHQ-2 Total Score 0 0       Interpretation of PHQ-9 Total Score   Score Severity   1-4 No Depression   5-9 Mild Depression   10-14 Moderate Depression   15-19 Moderately Severe Depression   20-27 Severe Depression      Medical records review:  I reviewed the note from the referring provider Hetal Mason including the note dated 8/25/2022.    ROS:   Red Flags ROS:   Fever, Chills, Sweats: Denies  Involuntary Weight Loss: Denies  Bladder Incontinence: Denies  Bowel Incontinence: denies  Saddle Anesthesia: Denies    All other systems reviewed and negative.     PMHx:   Past Medical History:   Diagnosis Date    Cancer (HCC)        PSHx:   Past Surgical History:   Procedure Laterality Date    LAP, REMOVE ADJUST BOSSMAN RESTRICT D* N/A 9/1/2022    Procedure: LAPAROSCOPIC GASTRIC BAND AND PORT REMOVAL;  Surgeon: Mook Alfred M.D.;  Location: SURGERY [facility];  Service: General    GASTRIC BANDING LAPAROSCOPIC  2012    OTHER ORTHOPEDIC SURGERY  1979    right wrist surgery       Family Hx:   Family History   Problem Relation Age of Onset    Stroke Mother         TIA, carotid endarterectomy    Hypertension Mother     Diabetes Mother     Cancer Father         basal and squamous cell skin    Hyperlipidemia Father     Hypertension Father        Social Hx:  Social History     Socioeconomic History    Marital status:      Spouse name: Not on file    Number  of children: Not on file    Years of education: Not on file    Highest education level: Not on file   Occupational History    Not on file   Tobacco Use    Smoking status: Never    Smokeless tobacco: Never   Vaping Use    Vaping Use: Never used   Substance and Sexual Activity    Alcohol use: Not Currently     Comment: rare    Drug use: Never    Sexual activity: Not on file   Other Topics Concern    Not on file   Social History Narrative    Not on file     Social Determinants of Health     Financial Resource Strain: Not on file   Food Insecurity: Not on file   Transportation Needs: Not on file   Physical Activity: Not on file   Stress: Not on file   Social Connections: Not on file   Intimate Partner Violence: Not on file   Housing Stability: Not on file       Allergies:  Allergies   Allergen Reactions    Clindamycin Anaphylaxis     Throat irritation    Codeine      nausea    Erythromycin      diarrhea    Oxycodone      vomitting      Penicillins Anaphylaxis    Sulfa Drugs Anaphylaxis    Versed      vomiting       Medications: reviewed on epic.   Outpatient Medications Marked as Taking for the 9/23/22 encounter (Office Visit) with Eden Ness M.D.   Medication Sig Dispense Refill    loratadine (CLARITIN) 10 MG Tab Take 10 mg by mouth every day.      Fexofenadine HCl (MUCINEX ALLERGY PO) Take  by mouth.      Multiple Vitamin (ONE-A-DAY ESSENTIAL PO) Take  by mouth.      Ascorbic Acid (VITAMIN C PO) Take  by mouth.      minoxidil (LONITEN) 2.5 MG Tab Take 1.25 mg by mouth every day.      cyclosporin (RESTASIS) 0.05 % ophthalmic emulsion Administer 1 Drop into both eyes 2 times a day.          Current Outpatient Medications on File Prior to Visit   Medication Sig Dispense Refill    loratadine (CLARITIN) 10 MG Tab Take 10 mg by mouth every day.      Fexofenadine HCl (MUCINEX ALLERGY PO) Take  by mouth.      Multiple Vitamin (ONE-A-DAY ESSENTIAL PO) Take  by mouth.      Ascorbic Acid (VITAMIN C PO) Take  by mouth.       "minoxidil (LONITEN) 2.5 MG Tab Take 1.25 mg by mouth every day.      cyclosporin (RESTASIS) 0.05 % ophthalmic emulsion Administer 1 Drop into both eyes 2 times a day.      acetaminophen (TYLENOL) 325 MG Tab Take 2 Tablets by mouth every 6 hours. take scheduled for 3 days post-op then PRN after 60 Tablet 0    omeprazole (PRILOSEC) 20 MG delayed-release capsule Take 1 Capsule by mouth every day. Open capsule and mix in 10cc H2O 30 Capsule 11    polyethylene glycol/lytes (MIRALAX) 17 g Pack Take 1 Packet by mouth every day. While taking narcotics, hold if greater then 3 BMs a day 20 Each 0    ondansetron (ZOFRAN ODT) 4 MG TABLET DISPERSIBLE Take 1 Tablet by mouth every 6 hours as needed for Nausea. 18 Tablet 0    ibuprofen (MOTRIN) 600 MG Tab Take 1 Tablet by mouth every 6 hours. Alternate w/ tylenol to take a medication every 3 hrs, take scheduled for 3 days post-op then PRN 45 Tablet 0     No current facility-administered medications on file prior to visit.         EXAMINATION     Physical Exam:   /70 (BP Location: Right arm, Patient Position: Sitting, BP Cuff Size: Adult)   Pulse 64   Temp 36.3 °C (97.3 °F) (Temporal)   Ht 1.651 m (5' 5\")   Wt 76.6 kg (168 lb 14 oz)   SpO2 97%     Constitutional:   Body Habitus: Body mass index is 28.1 kg/m².  Cooperation: Fully cooperates with exam  Appearance: Well-groomed, well-nourished.    Eyes: No scleral icterus to suggest severe liver disease, no proptosis to suggest severe hyperthyroidism    ENT -no obvious auditory deficits, no noticeable facial droop     Skin -no rashes or lesions noted     Respiratory-  breathing comfortably on room air, no audible wheezing    Cardiovascular-distal extremities warm and well perfused.  No lower extremity edema is noted.     Gastrointestinal - no obvious abdominal masses, non-distended    Psychiatric- alert and oriented ×3. Normal affect.     Gait -steady gait.  Feet clear the ground without difficulty.  Slight difficulty with " heel walking on the right.  No difficulty with toe walking bilaterally.    Musculoskeletal and Neuro -   Negative Tinel's at right fibular head and posterior right knee.  Negative Tinel's at left fibular head.    Thoracic/Lumbar Spine/Sacral Spine/Hips   Inspection: Slight atrophy of her right gastrocnemius muscle compared to the left     There is full active range of motion with lumbar extension    Facet loading maneuver negative bilaterally    Palpation:   No tenderness to palpation over the midline of lumbosacral spine, paraspinal muscles bilaterally, lumbar facets bilaterally, sacroiliac joints bilaterally, PSIS bilaterally, and greater trochanters bilaterally.     Lumbar spine /hip provocative exam maneuvers  Straight leg raise negative bilaterally  FADIR test negative bilaterally    SI joint tests  ANIKA test negative bilaterally    Key points for the international standards for neurological classification of spinal cord injury (ISNCSCI) to light touch.   Dermatome R L   L2 2 2   L3 2 2   L4 1 2   L5 1 2   S1 2 2   S2 2 2       Motor Exam Lower Extremities  ? Myotome R L   Hip flexion L2 5 5   Knee extension L3 5 5   Ankle dorsiflexion L4 4 5   Toe extension L5 4 5   Ankle plantarflexion S1 5 5       Reflexes  ?  R L   Patella  2+ 2+   Achilles   2+ 2+     Clonus of the ankle negative bilaterally       MEDICAL DECISION MAKING    Medical records review: see under HPI section.     DATA    Labs: Personally reviewed at today's visit:     Lab Results   Component Value Date/Time    SODIUM 139 08/24/2022 10:08 AM    POTASSIUM 4.0 08/24/2022 10:08 AM    CHLORIDE 102 08/24/2022 10:08 AM    CO2 25 08/24/2022 10:08 AM    ANION 12.0 08/24/2022 10:08 AM    GLUCOSE 88 08/24/2022 10:08 AM    BUN 24 (H) 08/24/2022 10:08 AM    CREATININE 0.58 08/24/2022 10:08 AM    CALCIUM 9.7 08/24/2022 10:08 AM    ASTSGOT 21 06/17/2022 06:39 AM    ALTSGPT 16 06/17/2022 06:39 AM    TBILIRUBIN 0.5 06/17/2022 06:39 AM    ALBUMIN 4.4 06/17/2022  06:39 AM    TOTPROTEIN 7.4 06/17/2022 06:39 AM    GLOBULIN 3.0 06/17/2022 06:39 AM    AGRATIO 1.5 06/17/2022 06:39 AM       No results found for: PROTHROMBTM, INR     Lab Results   Component Value Date/Time    WBC 4.9 08/24/2022 10:08 AM    RBC 5.13 08/24/2022 10:08 AM    HEMOGLOBIN 15.1 08/24/2022 10:08 AM    HEMATOCRIT 44.4 08/24/2022 10:08 AM    MCV 86.5 08/24/2022 10:08 AM    MCH 29.4 08/24/2022 10:08 AM    MCHC 34.0 08/24/2022 10:08 AM    MPV 9.4 08/24/2022 10:08 AM    NEUTSPOLYS 51.50 06/17/2022 06:39 AM    LYMPHOCYTES 38.30 06/17/2022 06:39 AM    MONOCYTES 6.50 06/17/2022 06:39 AM    EOSINOPHILS 2.20 06/17/2022 06:39 AM    BASOPHILS 1.30 06/17/2022 06:39 AM        Lab Results   Component Value Date/Time    HBA1C 5.1 06/17/2022 06:39 AM        Imaging:   I personally reviewed following images, these are my reads  MRI lumbar spine 9/16/2022  Shallow broad-based disc bulge at L3-4 and L4-5.  At L3-4 there is mild bilateral neuroforaminal stenosis.  At L4-5 there is moderate central canal stenosis and right lateral recess stenosis.  There is bilateral facet arthropathy most notable at L3-4, L4-5, and L5-S1.  There is no significant central canal stenosis or neuroforaminal stenosis elsewhere. See formal radiology report for further details.    IMAGING radiology reads. I reviewed the following radiology reads   MRI lumbar spine 9/16/2022      Results for orders placed during the hospital encounter of 08/25/22    DX-LUMBAR SPINE-2 OR 3 VIEWS    Impression  No acute osseous abnormality.  Mild retrolisthesis at L5-S1, likely degenerative.  Mild to moderate degenerative change of the lumbar spine, most at L5-S1.                         Diagnosis  Visit Diagnoses     ICD-10-CM   1. Lumbar radiculopathy  M54.16   2. Numbness and tingling of right leg  R20.0    R20.2   3. Right ankle dorsiflexor weakness  R29.898   4. Foot drop, right  M21.371   5. Atrophy of calf muscles on right  M62.561         ASSESSMENT AND  PLAN:  Romina Serrato ( 1965) is a female who presents with weakness of her right ankle dorsiflexor and great toe extensors with impaired sensation to light touch in the L4 and L5 distribution, likely consistent with L4 and L5 lumbar radiculopathy. Slight atrophy of her right gastrocnemius muscle compared to the left     She has negative provocative exam testing for peroneal neuropathy on exam.     Romina was seen today for new patient.    Diagnoses and all orders for this visit:    Lumbar radiculopathy  -     Referral to Pain Clinic  -     Referral to Physical Therapy    Numbness and tingling of right leg  -     Referral to Pain Clinic  -     Referral to Physical Therapy    Right ankle dorsiflexor weakness  -     Referral to Physical Therapy    Foot drop, right    Atrophy of calf muscles on right        PLAN  Physical Therapy: I ordered physical therapy to focus on strengthening and stretching as well as a home exercise program.     Diagnostic workup: Personally reviewed at today's visit: MRI lumbar spine 2022    Medications:   - No medication changes needed at this time- Patient denies significant pain     Interventions:   - right L4-5 transforaminal epidural steroid injection. The risks, benefits, and alternatives to this procedure were discussed and the patient wishes to proceed with the procedure. Risks include but are not limited to damage to surrounding structures, infection, bleeding, worsening of pain which can be permanent, and weakness which can be permanent. Benefits include pain relief and improved function. Alternatives include not doing the procedure.      Other  -If no significant improvement after injection above,  can consider EMG/NCS for further evaluation    Follow-up: 3-4 weeks after procedure above    Orders Placed This Encounter    Referral to Pain Clinic    Referral to Physical Therapy       Eden Ness MD  Interventional Pain and Spine  Physical Medicine and  Rehabilitation  [facility]    CC Ramírez Mason*     The above note documents my personal evaluation of this patient. In addition, I have reviewed and confirmed with the patient and MA the supportive information documented in today's Patient Health Questionnaire and Office Note.     Please note that this dictation was created using voice recognition software. I have made every reasonable attempt to correct obvious errors, but I expect that there are errors of grammar and possibly content that I did not discover before finalizing the note.

## 2022-10-03 ENCOUNTER — PHYSICAL THERAPY (OUTPATIENT)
Dept: PHYSICAL THERAPY | Facility: REHABILITATION | Age: 57
End: 2022-10-03
Attending: STUDENT IN AN ORGANIZED HEALTH CARE EDUCATION/TRAINING PROGRAM
Payer: COMMERCIAL

## 2022-10-03 DIAGNOSIS — M54.16 LUMBAR RADICULOPATHY: ICD-10-CM

## 2022-10-03 DIAGNOSIS — R20.2 NUMBNESS AND TINGLING OF RIGHT LEG: ICD-10-CM

## 2022-10-03 DIAGNOSIS — R20.0 NUMBNESS AND TINGLING OF RIGHT LEG: ICD-10-CM

## 2022-10-03 DIAGNOSIS — R29.898 RIGHT LEG WEAKNESS: ICD-10-CM

## 2022-10-03 PROCEDURE — 97110 THERAPEUTIC EXERCISES: CPT

## 2022-10-03 PROCEDURE — 97162 PT EVAL MOD COMPLEX 30 MIN: CPT

## 2022-10-03 SDOH — ECONOMIC STABILITY: GENERAL: QUALITY OF LIFE: GOOD

## 2022-10-03 ASSESSMENT — ENCOUNTER SYMPTOMS
EXACERBATED BY: PROLONGED STANDING
PAIN SCALE AT HIGHEST: 5
QUALITY: ACHING
QUALITY: SHARP
PAIN TIMING: OCCASIONAL
QUALITY: TINGLING
EXACERBATED BY: WALKING
PAIN SCALE AT LOWEST: 3
ALLEVIATING FACTORS: PAIN MEDICATION
PAIN SCALE: 3
PAIN TIMING: INTERMITTENT
AWAKENINGS PER NIGHT: 3
EXACERBATED BY: SITTING

## 2022-10-03 NOTE — OP THERAPY EVALUATION
Outpatient Physical Therapy  INITIAL EVALUATION    [facility] [place]  [address], [address]  [place] [address]  Phone:  [phone]    Date of Evaluation: 10/03/2022    Patient: Romina Serrato  YOB: 1965  MRN: [id number]     Referring Provider: Eden Ness M.D.  [address]  [address]  [place] [address]   Referring Diagnosis Lumbar radiculopathy [M54.16];Numbness and tingling of right leg [R20.0, R20.2];Right leg weakness [R29.898]     Time Calculation  Start time: 0300  Stop time: 0345 Time Calculation (min): 45 minutes         Chief Complaint: Back Problem, Difficulty Walking, and Foot Problem    Visit Diagnoses     ICD-10-CM   1. Lumbar radiculopathy  M54.16   2. Numbness and tingling of right leg  R20.0    R20.2   3. Right leg weakness  R29.898       Date of onset of impairment: 2022    Subjective:   History of Present Illness:     Date of onset:  2022    Mechanism of injury:  The patient is a 57 year old female who reports (R) LE pain and numbness and weakness following incidence in July with an accident involving her Pilates reformer. The patient has low back pain and has increased numbness to the (R) lower extremity. She indicates that her was using her Pilates machine and may have used to much resistance and also had a fall months ago. The patient has experienced past hx of Radicular symptoms. She cannot sleep on her sides at night to the (R) side. She had increased difficulty walking hiking and had difficulty walking for 2-3 miles before noticing increased pain the next day. She has aching down her leg into her knee and lower leg  Quality of life:  Good  Headaches:  no headaches  Ear problems: none  Sleep disturbance:  Interrupted sleep  # Times/Night awakened:  3  Pain:     Current pain rating:  3    At best pain rating:  3    At worst pain ratin    Quality:  Tingling, sharp and aching    Pain timing:  Intermittent and occasional     Relieving factors:  Pain medication    Aggravating factors:  Sitting, walking and prolonged standing    Pain Comments::  She often trips over her (R) LE foot with certain conditions walking   Social Support:     Lives in:  One-story house    Lives with:  Spouse  Hand dominance:  Right    Past Medical History:   Diagnosis Date    Cancer (HCC)      Past Surgical History:   Procedure Laterality Date    LAP, REMOVE ADJUST BOSSMAN RESTRICT D* N/A 9/1/2022    Procedure: LAPAROSCOPIC GASTRIC BAND AND PORT REMOVAL;  Surgeon: Mook Alfred M.D.;  Location: SURGERY [facility];  Service: General    GASTRIC BANDING LAPAROSCOPIC  2012    OTHER ORTHOPEDIC SURGERY  1979    right wrist surgery     Social History     Tobacco Use    Smoking status: Never    Smokeless tobacco: Never   Substance Use Topics    Alcohol use: Not Currently     Comment: rare     Family and Occupational History     Socioeconomic History    Marital status:      Spouse name: Not on file    Number of children: Not on file    Years of education: Not on file    Highest education level: Not on file   Occupational History    Not on file       Objective     Static Posture   General Observations  Asymmetrical weight bearing, left leg length discrepancy, shifted left and tilted left.     Postural Observations  Seated posture: fair  Standing posture: fair  Correction of posture: makes symptoms better      Palpation   Left   Hypertonic in the lumbar paraspinals.   Tenderness of the lumbar paraspinals.     Right   Hypertonic in the lumbar paraspinals.     Active Range of Motion     Lumbar   Flexion: within functional limits  Extension: within functional limits  Left lateral flexion: within functional limits  Right lateral flexion: decreased  Left rotation: within functional limits  Right rotation: decreased    Strength:      Abdominals   Lower abdominals: Able to maintain neutral statically    Back   Flexion: 4+  Extension: 4+    Left Hip   Planes of Motion    Flexion: 5  Extension: 5  Abduction: 5  Adduction: 5    Right Hip   Planes of Motion   Flexion: 5  Extension: 5  Abduction: 5  Adduction: 5    Left Knee   Flexion: 5  Extension: 5    Right Knee   Flexion: 5  Extension: 5    Left Ankle/Foot   Dorsiflexion: 5  Plantar flexion: 5    Right Ankle/Foot   Dorsiflexion: 4  Plantar flexion: 5    Tests       Lumbar spine (left)      Negative slump.   Lumbar spine (right)     Negative slump.     Right Pelvic Girdle/Sacrum   Positive: sacral rotation.     Left Hip   Negative SI compression and SI distraction.   SLR: Negative.     Right Hip   Negative SI compression and SI distraction.   SLR: Negative.   Ambulation     Observational Gait   Walking speed and right step length within functional limits. Increased left stance time, right stance time and right swing time. Decreased stride length, left swing time and left step length. Stride width: WFL.    Left arm swing: decreased  Right arm swing: decreased  Base of support: increased    Additional Observational Gait Details  Increased soreness to the (R) hip and lower extremity      Therapeutic Exercises (CPT 52260):     1. posterior pelvic tilts, 10x    2. bridges, 10x    3. toe raises, 20x    Therapeutic Treatments and Modalities:     1. Manual Therapy (CPT 52368)    2. Mechanical Traction (CPT 18179)  Time-based treatments/modalities:    Physical Therapy Timed Treatment Charges  Therapeutic exercise minutes (CPT 69776): 10 minutes      Assessment, Response and Plan:   Impairments: activity intolerance, impaired physical strength, lacks appropriate home exercise program, limited mobility, pain with function and weight-bearing intolerance    Assessment details:  The patient is a 57 year old female who reports pain to the low back ans (R) LE coupled with weakness with walking/ hiking, occasional tripping over the (R) foot walking and rolling over to her (R) side at night.  Barriers to therapy:  None  Prognosis: good    Goals:    Short Term Goals:   1) Indep with HEP  2) Able to roll over in bed to the (R) side without awaking as often at night by 1 less time at night  3) Increased strength to (R) DF by 1/2 MM grade to 4+/5  Short term goal time span:  2-4 weeks      Long Term Goals:    1) Progression/regression with HEP  2) Able to roll in bed without pain 50% or more of the time.  3) Less tripping 50%-75% of the time while walking during hikes  Long term goal time span:  4-6 weeks    Plan:   Therapy options:  Physical therapy treatment to continue  Planned therapy interventions:  Neuromuscular Re-education (CPT 61445), Self Care ADL Training (CPT 79768), Therapeutic Activities (CPT 01162), Therapeutic Exercise (CPT 39000), Manual Therapy (CPT 77741), Mechanical Traction (CPT 54802), E Stim Unattended (CPT 29292) and Gait Training (CPT 43390)  Frequency:  2x week  Duration in weeks:  6  Duration in visits:  12  Discussed with:  Patient    Functional Assessment Used  Aston Alpesh Low Back Pain and Disability Score: 16.67     Referring provider co-signature:  I have reviewed this plan of care and my co-signature certifies the need for services.    Certification Period: 10/03/2022 to  11/14/22    Physician Signature: ________________________________ Date: ______________

## 2022-10-04 ENCOUNTER — HOSPITAL ENCOUNTER (OUTPATIENT)
Dept: RADIOLOGY | Facility: REHABILITATION | Age: 57
End: 2022-10-04
Attending: STUDENT IN AN ORGANIZED HEALTH CARE EDUCATION/TRAINING PROGRAM

## 2022-10-04 ENCOUNTER — HOSPITAL ENCOUNTER (OUTPATIENT)
Facility: REHABILITATION | Age: 57
End: 2022-10-04
Attending: STUDENT IN AN ORGANIZED HEALTH CARE EDUCATION/TRAINING PROGRAM | Admitting: STUDENT IN AN ORGANIZED HEALTH CARE EDUCATION/TRAINING PROGRAM
Payer: COMMERCIAL

## 2022-10-04 VITALS
TEMPERATURE: 97.5 F | RESPIRATION RATE: 18 BRPM | WEIGHT: 167.55 LBS | DIASTOLIC BLOOD PRESSURE: 48 MMHG | HEART RATE: 68 BPM | OXYGEN SATURATION: 97 % | HEIGHT: 65 IN | BODY MASS INDEX: 27.92 KG/M2 | SYSTOLIC BLOOD PRESSURE: 120 MMHG

## 2022-10-04 PROCEDURE — 700117 HCHG RX CONTRAST REV CODE 255

## 2022-10-04 PROCEDURE — 64483 NJX AA&/STRD TFRM EPI L/S 1: CPT

## 2022-10-04 PROCEDURE — 700111 HCHG RX REV CODE 636 W/ 250 OVERRIDE (IP)

## 2022-10-04 RX ORDER — DEXAMETHASONE SODIUM PHOSPHATE 10 MG/ML
INJECTION, SOLUTION INTRAMUSCULAR; INTRAVENOUS
Status: COMPLETED
Start: 2022-10-04 | End: 2022-10-04

## 2022-10-04 RX ORDER — LIDOCAINE HYDROCHLORIDE 10 MG/ML
INJECTION, SOLUTION EPIDURAL; INFILTRATION; INTRACAUDAL; PERINEURAL
Status: COMPLETED
Start: 2022-10-04 | End: 2022-10-04

## 2022-10-04 RX ADMIN — DEXAMETHASONE SODIUM PHOSPHATE 10 MG: 10 INJECTION INTRAMUSCULAR; INTRAVENOUS at 14:17

## 2022-10-04 RX ADMIN — LIDOCAINE HYDROCHLORIDE 10 ML: 10 INJECTION, SOLUTION EPIDURAL; INFILTRATION; INTRACAUDAL; PERINEURAL at 14:17

## 2022-10-04 RX ADMIN — IOHEXOL 5 ML: 240 INJECTION, SOLUTION INTRATHECAL; INTRAVASCULAR; INTRAVENOUS; ORAL at 14:17

## 2022-10-04 ASSESSMENT — PAIN DESCRIPTION - PAIN TYPE
TYPE: CHRONIC PAIN
TYPE: CHRONIC PAIN

## 2022-10-04 ASSESSMENT — FIBROSIS 4 INDEX: FIB4 SCORE: 0.72

## 2022-10-04 NOTE — INTERVAL H&P NOTE
H&P reviewed. The patient was examined and there are no changes to the H&P    Eden Ness MD  Interventional Pain and Spine  Physical Medicine and Rehabilitation  [facility]

## 2022-10-04 NOTE — PROGRESS NOTES
Dr. Ness in to see patient, questions answered.  Discharge instructions given with pt understanding.    1431 Dr. Ness in to see patient, discharge orders received.  Steady gait.

## 2022-10-07 ENCOUNTER — TELEPHONE (OUTPATIENT)
Dept: PHYSICAL MEDICINE AND REHAB | Facility: MEDICAL CENTER | Age: 57
End: 2022-10-07
Payer: COMMERCIAL

## 2022-10-07 NOTE — TELEPHONE ENCOUNTER
Phone Number Called: [phone]    Call outcome: Left detailed message for patient. Informed to call back with any additional questions.    Message: LM to follow up after SP with Dr. Ness on 10/4. Requested call back if pt had any questions/concerns.    FV set for 11/3

## 2022-10-25 ENCOUNTER — PHYSICAL THERAPY (OUTPATIENT)
Dept: PHYSICAL THERAPY | Facility: REHABILITATION | Age: 57
End: 2022-10-25
Attending: STUDENT IN AN ORGANIZED HEALTH CARE EDUCATION/TRAINING PROGRAM
Payer: COMMERCIAL

## 2022-10-25 DIAGNOSIS — R20.0 NUMBNESS AND TINGLING OF RIGHT LEG: ICD-10-CM

## 2022-10-25 DIAGNOSIS — R29.898 RIGHT LEG WEAKNESS: ICD-10-CM

## 2022-10-25 DIAGNOSIS — R20.2 NUMBNESS AND TINGLING OF RIGHT LEG: ICD-10-CM

## 2022-10-25 DIAGNOSIS — M54.16 LUMBAR RADICULOPATHY: ICD-10-CM

## 2022-10-25 PROCEDURE — 97140 MANUAL THERAPY 1/> REGIONS: CPT

## 2022-10-25 PROCEDURE — 97112 NEUROMUSCULAR REEDUCATION: CPT

## 2022-10-25 PROCEDURE — 97110 THERAPEUTIC EXERCISES: CPT

## 2022-10-25 NOTE — OP THERAPY DAILY TREATMENT
Outpatient Physical Therapy  DAILY TREATMENT     [facility] Outpatient Physical Therapy [address], [address]  [place] [address]  Phone:  [phone]    Date: 10/25/2022    Patient: Romina Serrato  YOB: 1965  MRN: [id number]     Time Calculation    Start time: 0415  Stop time: 0500 Time Calculation (min): 45 minutes         Chief Complaint: Back Problem    Visit #: 2    SUBJECTIVE:  The patient reports stiffness to lumbar and (R) hip area today    OBJECTIVE:  Current objective measures:       Decrease pain to the (R) LE; decrease tightness to the lumbar region    Therapeutic Exercises (CPT 76820):     1. posterior pelvic tilts, 10x    2. bridges, 2 x10 reps    3. abdominal crunches with tilts, 2 x10 reps    4. seated hamstring stretches, 3 x30 sec    5. cat camel stretch, 5x 20 sec    Therapeutic Treatments and Modalities:     1. Manual Therapy (CPT 35856), IASTM to the posterior glute /ITB    2. Mechanical Traction (CPT 18242)  Time-based treatments/modalities:    Physical Therapy Timed Treatment Charges  Manual therapy minutes (CPT 64798): 15 minutes  Neuromusc re-ed, balance, coor, post minutes (CPT 90240): 15 minutes  Therapeutic exercise minutes (CPT 58873): 15 minutes    ASSESSMENT:   Response to treatment:   IASTM to the (R) hip glute ans ITB/TFL proximal to distal; tenderness tightness present but dissipated over the course of treatment. Patient given self care instructions on TrA activation and proper posterior pelvic tilt positioning to alleviate pressure in the lumbar and improve abdominal tone and support for lumbar        PLAN/RECOMMENDATIONS:   Plan for treatment: therapy treatment to continue next visit.  Planned interventions for next visit: continue with current treatment.

## 2022-11-01 ENCOUNTER — APPOINTMENT (OUTPATIENT)
Dept: PHYSICAL THERAPY | Facility: REHABILITATION | Age: 57
End: 2022-11-01
Attending: STUDENT IN AN ORGANIZED HEALTH CARE EDUCATION/TRAINING PROGRAM
Payer: COMMERCIAL

## 2022-11-02 ENCOUNTER — APPOINTMENT (OUTPATIENT)
Dept: PHYSICAL THERAPY | Facility: REHABILITATION | Age: 57
End: 2022-11-02
Payer: COMMERCIAL

## 2022-11-03 ENCOUNTER — OFFICE VISIT (OUTPATIENT)
Dept: PHYSICAL MEDICINE AND REHAB | Facility: MEDICAL CENTER | Age: 57
End: 2022-11-03
Payer: COMMERCIAL

## 2022-11-03 VITALS
HEART RATE: 57 BPM | OXYGEN SATURATION: 99 % | TEMPERATURE: 96.8 F | WEIGHT: 164.46 LBS | BODY MASS INDEX: 27.4 KG/M2 | SYSTOLIC BLOOD PRESSURE: 112 MMHG | DIASTOLIC BLOOD PRESSURE: 72 MMHG | HEIGHT: 65 IN

## 2022-11-03 DIAGNOSIS — M62.561 ATROPHY OF CALF MUSCLES ON RIGHT: ICD-10-CM

## 2022-11-03 DIAGNOSIS — R20.2 NUMBNESS AND TINGLING OF RIGHT LEG: ICD-10-CM

## 2022-11-03 DIAGNOSIS — R29.898 RIGHT LEG WEAKNESS: ICD-10-CM

## 2022-11-03 DIAGNOSIS — R20.0 NUMBNESS AND TINGLING OF RIGHT LEG: ICD-10-CM

## 2022-11-03 DIAGNOSIS — M21.371 FOOT DROP, RIGHT: ICD-10-CM

## 2022-11-03 DIAGNOSIS — M54.16 LUMBAR RADICULOPATHY: ICD-10-CM

## 2022-11-03 PROCEDURE — 99212 OFFICE O/P EST SF 10 MIN: CPT | Performed by: STUDENT IN AN ORGANIZED HEALTH CARE EDUCATION/TRAINING PROGRAM

## 2022-11-03 ASSESSMENT — PAIN SCALES - GENERAL: PAINLEVEL: 3=SLIGHT PAIN

## 2022-11-03 ASSESSMENT — PATIENT HEALTH QUESTIONNAIRE - PHQ9: CLINICAL INTERPRETATION OF PHQ2 SCORE: 0

## 2022-11-03 ASSESSMENT — FIBROSIS 4 INDEX: FIB4 SCORE: 0.72

## 2022-11-03 NOTE — PROGRESS NOTES
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 She is unsure what started this but thinks it could've been from an accident with her Pilates reformer machine in May, a fall onto her right side in May, or a fall onto her right side on  when her dog pulled her down.  After her Pilates reformer machine accident in May, she had severe radiating pain down her posterolateral right glute down her lateral leg to her ankle that lasted approximately 4-6 weeks.       In early July she started noticing numbness radiating from her lateral right knee down to her foot. This is mildly painful. She then noticed slapping of her right foot when walking.  She endorses difficulty wearing heels due to flopping of her right foot and weakness of her right ankle dorsiflexor.  She tripped over her right foot every other day.  Endorses pain that wakes her up from sleep when she rolls onto her right side. Strength and numbness haven't changed in severity since onset.  She denies pain significantly limited by pain.  Pain severity currently 4/10.  Her symptoms do not change with any activity or positioning.     Notes that she has intentionally lost 80 lbs with diet changes since 2022 and now has been crossing her right leg over her left more.  She notices this can exacerbate the numbness and weakness in her leg.      The patient has not tried medications, injections, or physical therapy for this problem.  She denies previous history of this issue.  She does endorse remote history of right ankle fracture when she was 18 but no residual issues with numbness, ankle  stiffness, or weakness.     Medical history includes cataracts, skin cancer    HPI  Today's visit   Romina Serrato ( 1965) is a female with Diagnoses of Lumbar radiculopathy, Numbness and tingling of right leg, Right ankle dorsiflexor weakness, Foot drop, right, and Atrophy of calf muscles on right were pertinent to this visit.    Presents today after 10/4/22 Lumbar Transforaminal Epidural Steroid Injection at the  right  L4-5 levels. Notes improvement in RLE numbness but now feels some pain which is tolerable. Feels that strength hasn't changed.  Notes that pain can fluctuate randomly, often without known pattern.  It can worsen with exercise or prolonged sitting or sleeping on her right side.  Pain does not interfere with sleep, as she has adjusted her sleeping position still on her left side.    Just started PT and pilates.     She is currently taking ibuprofen as needed with relief.      Pain severity 5/10 currently  Pt denies new numbness, tingling, or weakness.    Procedure history:  - 10/4/22 Lumbar Transforaminal Epidural Steroid Injection at the  right  L4-5 levels -improvement in numbness, no change in strength, onset of slight pain afterwards, similar to her pain that she experienced at the initial onset of her symptoms      ROS:   Red Flags ROS:   Fever, Chills, Sweats: Denies  Involuntary Weight Loss: Denies  Bladder Incontinence: Denies  Bowel Incontinence: denies  Saddle Anesthesia: Denies    All other systems reviewed and negative.     PMHx:   Past Medical History:   Diagnosis Date    Cancer (HCC)        PSHx:   Past Surgical History:   Procedure Laterality Date    UT NJX AA&/STRD TFRML EPI LUMBAR/SACRAL 1 LEVEL Right 10/4/2022    Procedure: RIGHT L4-5 transforaminal epidural steroid injection with fluoroscopic guidance;  Surgeon: Eden Ness M.D.;  [facility];  Service: Pain Management    LAP, REMOVE ADJUST BOSSMAN RESTRICT D* N/A 2022    Procedure:  LAPAROSCOPIC GASTRIC BAND AND PORT REMOVAL;  Surgeon: Mook Alfred M.D.;  Location: [facility];  Service: General    GASTRIC BANDING LAPAROSCOPIC  2012    OTHER ORTHOPEDIC SURGERY  1979    right wrist surgery       Family Hx:   Family History   Problem Relation Age of Onset    Stroke Mother         TIA, carotid endarterectomy    Hypertension Mother     Diabetes Mother     Cancer Father         basal and squamous cell skin    Hyperlipidemia Father     Hypertension Father        Social Hx:  Social History     Socioeconomic History    Marital status:      Spouse name: Not on file    Number of children: Not on file    Years of education: Not on file    Highest education level: Not on file   Occupational History    Not on file   Tobacco Use    Smoking status: Never    Smokeless tobacco: Never   Vaping Use    Vaping Use: Never used   Substance and Sexual Activity    Alcohol use: Not Currently     Comment: rare    Drug use: Never    Sexual activity: Not on file   Other Topics Concern    Not on file   Social History Narrative    Not on file     Social Determinants of Health     Financial Resource Strain: Not on file   Food Insecurity: Not on file   Transportation Needs: Not on file   Physical Activity: Not on file   Stress: Not on file   Social Connections: Not on file   Intimate Partner Violence: Not on file   Housing Stability: Not on file       Allergies:  Allergies   Allergen Reactions    Caffeine Unspecified and Vomiting     migraines  migraines      Clindamycin Anaphylaxis     Throat irritation    Codeine      nausea    Erythromycin      diarrhea    Fentanyl      Other reaction(s): GI Upset  In combo with versed  In combo with versed      Oxycodone      vomitting      Penicillins Anaphylaxis    Sulfa Drugs Anaphylaxis    Versed      vomiting       Medications: reviewed on epic.   Outpatient Medications Marked as Taking for the 11/3/22 encounter (Office Visit) with Eden Ness M.D.   Medication Sig  "Dispense Refill    loratadine (CLARITIN) 10 MG Tab Take 10 mg by mouth every day.      Fexofenadine HCl (MUCINEX ALLERGY PO) Take  by mouth.      Multiple Vitamin (ONE-A-DAY ESSENTIAL PO) Take  by mouth.      Ascorbic Acid (VITAMIN C PO) Take  by mouth.      minoxidil (LONITEN) 2.5 MG Tab Take 1.25 mg by mouth every day.      cyclosporin (RESTASIS) 0.05 % ophthalmic emulsion Administer 1 Drop into both eyes 2 times a day.          Current Outpatient Medications on File Prior to Visit   Medication Sig Dispense Refill    loratadine (CLARITIN) 10 MG Tab Take 10 mg by mouth every day.      Fexofenadine HCl (MUCINEX ALLERGY PO) Take  by mouth.      Multiple Vitamin (ONE-A-DAY ESSENTIAL PO) Take  by mouth.      Ascorbic Acid (VITAMIN C PO) Take  by mouth.      minoxidil (LONITEN) 2.5 MG Tab Take 1.25 mg by mouth every day.      cyclosporin (RESTASIS) 0.05 % ophthalmic emulsion Administer 1 Drop into both eyes 2 times a day.       No current facility-administered medications on file prior to visit.         EXAMINATION     Physical Exam:   /72 (BP Location: Right arm, Patient Position: Sitting, BP Cuff Size: Adult)   Pulse (!) 57   Temp 36 °C (96.8 °F) (Temporal)   Ht 1.651 m (5' 5\")   Wt 74.6 kg (164 lb 7.4 oz)   SpO2 99%     Constitutional:   Body Habitus: Body mass index is 27.37 kg/m².  Cooperation: Fully cooperates with exam  Appearance: Well-groomed, well-nourished.    Eyes: No scleral icterus to suggest severe liver disease, no proptosis to suggest severe hyperthyroidism    ENT -no obvious auditory deficits, no noticeable facial droop     Skin -no rashes or lesions noted     Respiratory-  breathing comfortably on room air, no audible wheezing    Cardiovascular-distal extremities warm and well perfused.  No lower extremity edema is noted.     Gastrointestinal - no obvious abdominal masses, non-distended    Psychiatric- alert and oriented ×3. Normal affect.     Gait -steady gait.  Feet clear the ground " without difficulty.  Slight difficulty with heel walking on the right.  No difficulty with toe walking bilaterally.     Musculoskeletal and Neuro -   Negative Tinel's at right fibular head and posterior right knee.  Negative Tinel's at left fibular head.     Thoracic/Lumbar Spine/Sacral Spine/Hips   Inspection: Slight atrophy of her right gastrocnemius muscle compared to the left       Lumbar spine /hip provocative exam maneuvers  Straight leg raise negative bilaterally  FADIR test negative bilaterally     SI joint tests  ANIKA test negative bilaterally     Key points for the international standards for neurological classification of spinal cord injury (ISNCSCI) to light touch.   Dermatome R L   L2 2 2   L3 2 2   L4 1 2   L5 1 2   S1 2 2   S2 2 2         Motor Exam Lower Extremities  ? Myotome R L   Hip flexion L2 5 5   Knee extension L3 5 5   Ankle dorsiflexion L4 4 5   Toe extension L5 4 5   Ankle plantarflexion S1 5 5      Previous exam  There is full active range of motion with lumbar extension     Facet loading maneuver negative bilaterally     Palpation:   No tenderness to palpation over the midline of lumbosacral spine, paraspinal muscles bilaterally, lumbar facets bilaterally, sacroiliac joints bilaterally, PSIS bilaterally, and greater trochanters bilaterally.     Reflexes  ?   R L   Patella   2+ 2+   Achilles    2+ 2+      Clonus of the ankle negative bilaterally         MEDICAL DECISION MAKING    Medical records review: see under HPI section.     DATA    Labs: No new labs available for review since last visit.    Lab Results   Component Value Date/Time    SODIUM 139 08/24/2022 10:08 AM    POTASSIUM 4.0 08/24/2022 10:08 AM    CHLORIDE 102 08/24/2022 10:08 AM    CO2 25 08/24/2022 10:08 AM    ANION 12.0 08/24/2022 10:08 AM    GLUCOSE 88 08/24/2022 10:08 AM    BUN 24 (H) 08/24/2022 10:08 AM    CREATININE 0.58 08/24/2022 10:08 AM    CALCIUM 9.7 08/24/2022 10:08 AM    ASTSGOT 21 06/17/2022 06:39 AM    ALTSGPT  16 06/17/2022 06:39 AM    TBILIRUBIN 0.5 06/17/2022 06:39 AM    ALBUMIN 4.4 06/17/2022 06:39 AM    TOTPROTEIN 7.4 06/17/2022 06:39 AM    GLOBULIN 3.0 06/17/2022 06:39 AM    AGRATIO 1.5 06/17/2022 06:39 AM       No results found for: PROTHROMBTM, INR     Lab Results   Component Value Date/Time    WBC 4.9 08/24/2022 10:08 AM    RBC 5.13 08/24/2022 10:08 AM    HEMOGLOBIN 15.1 08/24/2022 10:08 AM    HEMATOCRIT 44.4 08/24/2022 10:08 AM    MCV 86.5 08/24/2022 10:08 AM    MCH 29.4 08/24/2022 10:08 AM    MCHC 34.0 08/24/2022 10:08 AM    MPV 9.4 08/24/2022 10:08 AM    NEUTSPOLYS 51.50 06/17/2022 06:39 AM    LYMPHOCYTES 38.30 06/17/2022 06:39 AM    MONOCYTES 6.50 06/17/2022 06:39 AM    EOSINOPHILS 2.20 06/17/2022 06:39 AM    BASOPHILS 1.30 06/17/2022 06:39 AM        Lab Results   Component Value Date/Time    HBA1C 5.1 06/17/2022 06:39 AM        Imaging:   I personally reviewed following images, these are my reads  MRI lumbar spine 9/16/2022  Shallow broad-based disc bulge at L3-4 and L4-5.  At L3-4 there is mild bilateral neuroforaminal stenosis.  At L4-5 there is moderate central canal stenosis and right lateral recess stenosis.  There is bilateral facet arthropathy most notable at L3-4, L4-5, and L5-S1.  There is no significant central canal stenosis or neuroforaminal stenosis elsewhere. See formal radiology report for further details.     IMAGING radiology reads. I reviewed the following radiology reads   MRI lumbar spine 9/16/2022       Results for orders placed during the hospital encounter of 08/25/22     DX-LUMBAR SPINE-2 OR 3 VIEWS     Impression  No acute osseous abnormality.  Mild retrolisthesis at L5-S1, likely degenerative.  Mild to moderate degenerative change of the lumbar spine, most at L5-S1.    Diagnosis  Visit Diagnoses     ICD-10-CM   1. Lumbar radiculopathy  M54.16   2. Numbness and tingling of right leg  R20.0    R20.2   3. Right ankle dorsiflexor weakness  R29.898   4. Foot drop, right  M21.371   5.  Atrophy of calf muscles on right  M62.561         ASSESSMENT AND PLAN:  Romina Serrato (: 1965) is a female who presents with ongoing weakness of her right ankle dorsiflexor and great toe extensors with impaired sensation to light touch in the L4 and L5 distribution, likely consistent with L4 and L5 lumbar radiculopathy. Slight atrophy of her right gastrocnemius muscle compared to the left      She has negative provocative exam testing for peroneal neuropathy on exam.        Romina was seen today for follow-up.    Diagnoses and all orders for this visit:    Lumbar radiculopathy    Numbness and tingling of right leg    Right ankle dorsiflexor weakness    Foot drop, right    Atrophy of calf muscles on right        PLAN  Physical Therapy: Discussed that she should continue physical therapy which should hopefully improve her strength.  Symptoms     Diagnostic workup: no new imaging needed at this time    Medications:   - Discussed that she may continue OTC ibuprofen  - discussed possible prescription for gabapentin if pain becomes intolerable.  Patient would like to defer this at this time as her pain is tolerable.       Interventions:   -Could consider repeat  right L4-5 transforaminal epidural steroid injection in the future if symptoms do not significantly improve with physical therapy.  Discussed that at this time I do not feel strongly that we need to repeat it as her symptoms are overall tolerable and I am hopeful that physical therapy will improve her symptoms.     Other  -Discussed that if there is no severe improvement after physical therapy,  can consider EMG/NCS for further evaluation    Follow-up: In late December to assess progress with PT    No orders of the defined types were placed in this encounter.      Eden Ness MD  Interventional Pain and Spine  Physical Medicine and Rehabilitation  [facility]      The above note documents my personal evaluation of this patient. In  addition, I have reviewed and confirmed with the patient and MA the supportive information documented in today's Patient Health Questionnaire and Office Note.     Please note that this dictation was created using voice recognition software. I have made every reasonable attempt to correct obvious errors, but I expect that there are errors of grammar and possibly content that I did not discover before finalizing the note.

## 2022-11-08 ENCOUNTER — PHYSICAL THERAPY (OUTPATIENT)
Dept: PHYSICAL THERAPY | Facility: REHABILITATION | Age: 57
End: 2022-11-08
Attending: STUDENT IN AN ORGANIZED HEALTH CARE EDUCATION/TRAINING PROGRAM
Payer: COMMERCIAL

## 2022-11-08 DIAGNOSIS — R29.898 RIGHT LEG WEAKNESS: ICD-10-CM

## 2022-11-08 DIAGNOSIS — M54.16 LUMBAR RADICULOPATHY: ICD-10-CM

## 2022-11-08 DIAGNOSIS — R20.0 NUMBNESS AND TINGLING OF RIGHT LEG: ICD-10-CM

## 2022-11-08 DIAGNOSIS — R20.2 NUMBNESS AND TINGLING OF RIGHT LEG: ICD-10-CM

## 2022-11-08 PROCEDURE — 97112 NEUROMUSCULAR REEDUCATION: CPT

## 2022-11-08 PROCEDURE — 97110 THERAPEUTIC EXERCISES: CPT

## 2022-11-08 PROCEDURE — 97140 MANUAL THERAPY 1/> REGIONS: CPT

## 2022-11-08 NOTE — OP THERAPY DAILY TREATMENT
Outpatient Physical Therapy  DAILY TREATMENT     [facility] Outpatient Physical Therapy [address] [facility], [address]  [place] [address]  Phone:  [phone]    Date: 11/08/2022    Patient: Romina Serrato  YOB: 1965  MRN: [id number]     Time Calculation    Start time: 0415  Stop time: 0500 Time Calculation (min): 45 minutes         Chief Complaint: Back Problem    Visit #: 3    SUBJECTIVE:  The patient reports having more increasd (R) thigh and hip pain since last treatment     OBJECTIVE:  Current objective measures:       Increase (R) lateral flexion and rotation in trunk; decrease hypomobility in lumbar     Therapeutic Exercises (CPT 77093):     1. posterior pelvic tilts, 10x    2. bridges, 2 x10 reps    3. abdominal crunches with tilts, 2 x10 reps, patient did not like due to neck pain    4. seated hamstring stretches (flossing), 3 x30 sec    5. cat camel stretch, 5x 20 sec    6. QL stretch, 2-3'    7. piriformis stretch, 3 x30 sec    8. clamshells    9. prone hip extension    Therapeutic Treatments and Modalities:     1. Manual Therapy (CPT 84187), IASTM to the posterior glute /ITB    2. Mechanical Traction (CPT 21682), Lumbar  Time-based treatments/modalities:    Physical Therapy Timed Treatment Charges  Manual therapy minutes (CPT 71051): 15 minutes  Neuromusc re-ed, balance, coor, post minutes (CPT 07611): 15 minutes  Therapeutic exercise minutes (CPT 49374): 15 minutes    ASSESSMENT:   Response to treatment:   IASTM to the (R) hip with moderate pressure with Hawk  tool; initial tenderness to the (R) glute medius, piriformis; however dissipated following treatment. Patient educated n QL stretches and piriformis stretches and hamstring from supine positions. Completed with proper form. Next visit: improve trunk rotations (R) and decrease lumbar hypomobility        PLAN/RECOMMENDATIONS:   Plan for treatment: therapy treatment to continue next visit.  Planned interventions for next visit:  continue with current treatment.

## 2022-11-10 ENCOUNTER — PHYSICAL THERAPY (OUTPATIENT)
Dept: PHYSICAL THERAPY | Facility: REHABILITATION | Age: 57
End: 2022-11-10
Attending: STUDENT IN AN ORGANIZED HEALTH CARE EDUCATION/TRAINING PROGRAM
Payer: COMMERCIAL

## 2022-11-10 DIAGNOSIS — R20.0 NUMBNESS AND TINGLING OF RIGHT LEG: ICD-10-CM

## 2022-11-10 DIAGNOSIS — R29.898 RIGHT LEG WEAKNESS: ICD-10-CM

## 2022-11-10 DIAGNOSIS — R20.2 NUMBNESS AND TINGLING OF RIGHT LEG: ICD-10-CM

## 2022-11-10 DIAGNOSIS — M54.16 LUMBAR RADICULOPATHY: ICD-10-CM

## 2022-11-10 PROCEDURE — 97112 NEUROMUSCULAR REEDUCATION: CPT

## 2022-11-10 PROCEDURE — 97110 THERAPEUTIC EXERCISES: CPT

## 2022-11-10 PROCEDURE — 97140 MANUAL THERAPY 1/> REGIONS: CPT

## 2022-11-10 NOTE — OP THERAPY DAILY TREATMENT
Outpatient Physical Therapy  DAILY TREATMENT     [facility] Outpatient Physical Therapy [place] [address] [address] [facility], [address]  [place] [address]  Phone:  [phone]    Date: 11/10/2022    Patient: Romina Serrato  YOB: 1965  MRN: [id number]     Time Calculation    Start time: 0400  Stop time: 0445 Time Calculation (min): 45 minutes         Chief Complaint: Back Problem    Visit #: 4    SUBJECTIVE:  The patient reports doing Pilates with a reformer and went the full 60 minutes.     OBJECTIVE:  Current objective measures:       Improve hamstring length to the (R) side; improve strength to trunk stabilizers; decompression to lumbar, self care    Therapeutic Exercises (CPT 24341):     1. posterior pelvic tilts, 10x    2. bridges/ bridges with hip abduction, 2 x10 reps, xx    3. abdominal crunches with tilts, 2 x10 reps, neck pain    4. hamstring stretches, 3 x30 sec, xx    5. cat camel stretch, 5x 20 sec    6. QL stretch, 2-3'    7. piriformis stretch, 3 x30 sec    8. clamshells    9. prone hip extension    10. prone over theraball    11. quadriped UE/LE lifts, 1 x10 reps with 5 sec hold    12. prone roll outs over theraball, 10 x    13. prone planks over theraball, 3 x 30 sec    Therapeutic Treatments and Modalities:     1. Manual Therapy (CPT 92662), IASTM to the posterior glute /ITB/ TFL, (R) LE lumbar traction manual techniques 5 x20 sec on 5 sec off  Time-based treatments/modalities:     ASSESSMENT:   Response to treatment:   IASTM to the (R) piriformis/ glute hip; moderate pressure with Hawk  tool; continued symptoms following treatment. Manual traction applied to (R) LE 5 x 20 sec ; patient tolerated well with no increased pain symptoms. Self care instructions for progressing core strengthening exercises using thera-ball for increasing trunk stabilization strength.     PLAN/RECOMMENDATIONS:   Plan for treatment: therapy treatment to continue next visit.  Planned interventions for next visit:  continue with current treatment.

## 2022-11-15 ENCOUNTER — PHYSICAL THERAPY (OUTPATIENT)
Dept: PHYSICAL THERAPY | Facility: REHABILITATION | Age: 57
End: 2022-11-15
Attending: STUDENT IN AN ORGANIZED HEALTH CARE EDUCATION/TRAINING PROGRAM
Payer: COMMERCIAL

## 2022-11-15 ENCOUNTER — APPOINTMENT (RX ONLY)
Dept: URBAN - METROPOLITAN AREA CLINIC 36 | Facility: CLINIC | Age: 57
Setting detail: DERMATOLOGY
End: 2022-11-15

## 2022-11-15 DIAGNOSIS — Z41.9 ENCOUNTER FOR PROCEDURE FOR PURPOSES OTHER THAN REMEDYING HEALTH STATE, UNSPECIFIED: ICD-10-CM

## 2022-11-15 DIAGNOSIS — R20.2 NUMBNESS AND TINGLING OF RIGHT LEG: ICD-10-CM

## 2022-11-15 DIAGNOSIS — R20.0 NUMBNESS AND TINGLING OF RIGHT LEG: ICD-10-CM

## 2022-11-15 DIAGNOSIS — M54.16 LUMBAR RADICULOPATHY: ICD-10-CM

## 2022-11-15 PROCEDURE — 97140 MANUAL THERAPY 1/> REGIONS: CPT

## 2022-11-15 PROCEDURE — ? DERMAPLANE

## 2022-11-15 PROCEDURE — ? HYDRAFACIAL

## 2022-11-15 PROCEDURE — 97112 NEUROMUSCULAR REEDUCATION: CPT

## 2022-11-15 PROCEDURE — ? CHEMICAL PEEL

## 2022-11-15 PROCEDURE — 97110 THERAPEUTIC EXERCISES: CPT

## 2022-11-15 ASSESSMENT — LOCATION SIMPLE DESCRIPTION DERM
LOCATION SIMPLE: NOSE
LOCATION SIMPLE: LEFT FOREHEAD
LOCATION SIMPLE: LEFT CHEEK
LOCATION SIMPLE: CHIN
LOCATION SIMPLE: RIGHT CHEEK
LOCATION SIMPLE: NOSE
LOCATION SIMPLE: RIGHT FOREHEAD
LOCATION SIMPLE: LEFT CHEEK
LOCATION SIMPLE: RIGHT FOREHEAD
LOCATION SIMPLE: RIGHT CHEEK
LOCATION SIMPLE: RIGHT LIP
LOCATION SIMPLE: GLABELLA
LOCATION SIMPLE: LEFT FOREHEAD
LOCATION SIMPLE: CHIN

## 2022-11-15 ASSESSMENT — LOCATION DETAILED DESCRIPTION DERM
LOCATION DETAILED: LEFT INFERIOR LATERAL FOREHEAD
LOCATION DETAILED: LEFT CENTRAL BUCCAL CHEEK
LOCATION DETAILED: RIGHT CHIN
LOCATION DETAILED: LEFT CENTRAL MALAR CHEEK
LOCATION DETAILED: NASAL SUPRATIP
LOCATION DETAILED: LEFT SUPERIOR MEDIAL FOREHEAD
LOCATION DETAILED: LEFT SUPERIOR MEDIAL FOREHEAD
LOCATION DETAILED: RIGHT INFERIOR MEDIAL FOREHEAD
LOCATION DETAILED: GLABELLA
LOCATION DETAILED: RIGHT CENTRAL BUCCAL CHEEK
LOCATION DETAILED: RIGHT CENTRAL MALAR CHEEK
LOCATION DETAILED: RIGHT INFERIOR CENTRAL MALAR CHEEK
LOCATION DETAILED: LEFT INFERIOR CENTRAL MALAR CHEEK
LOCATION DETAILED: LEFT CENTRAL BUCCAL CHEEK
LOCATION DETAILED: NASAL DORSUM
LOCATION DETAILED: LEFT SUPERIOR LATERAL BUCCAL CHEEK
LOCATION DETAILED: LEFT INFERIOR CENTRAL MALAR CHEEK
LOCATION DETAILED: RIGHT FOREHEAD
LOCATION DETAILED: RIGHT SUPERIOR FOREHEAD
LOCATION DETAILED: RIGHT FOREHEAD
LOCATION DETAILED: RIGHT INFERIOR LATERAL FOREHEAD
LOCATION DETAILED: LEFT FOREHEAD
LOCATION DETAILED: LEFT CHIN
LOCATION DETAILED: RIGHT SUPERIOR MEDIAL BUCCAL CHEEK
LOCATION DETAILED: LEFT LATERAL MALAR CHEEK
LOCATION DETAILED: RIGHT LOWER CUTANEOUS LIP
LOCATION DETAILED: RIGHT SUPERIOR MEDIAL BUCCAL CHEEK
LOCATION DETAILED: LEFT LATERAL FOREHEAD
LOCATION DETAILED: RIGHT LATERAL MALAR CHEEK
LOCATION DETAILED: LEFT INFERIOR MEDIAL FOREHEAD
LOCATION DETAILED: NASAL SUPRATIP
LOCATION DETAILED: LEFT SUPERIOR CENTRAL BUCCAL CHEEK
LOCATION DETAILED: RIGHT INFERIOR CENTRAL MALAR CHEEK
LOCATION DETAILED: LEFT SUPERIOR FOREHEAD

## 2022-11-15 ASSESSMENT — LOCATION ZONE DERM
LOCATION ZONE: NOSE
LOCATION ZONE: FACE
LOCATION ZONE: FACE
LOCATION ZONE: LIP
LOCATION ZONE: NOSE

## 2022-11-15 NOTE — PROCEDURE: CHEMICAL PEEL
Post-Care Instructions: I reviewed with the patient in detail post-care instructions. Patient should avoid sun exposure and wear sun protection.
Post Peel Care: After the procedure Sun protection and post-care instructions were reviewed with the patient.
Treatment Number: 1
Comments: Details for tx on10/13/22
Price (Use Numbers Only, No Special Characters Or $): 150
Consent: Prior to the procedure, written consent was obtained and risks were reviewed, including but not limited to: redness, peeling, blistering, pigmentary change, scarring, infection, and pain.
Chemical Peel: MicroPeel Sensitive 2
Number Of Layers: 2
Prep: The treated area was degreased with pre-peel cleanser, acetone and vaseline was applied for protection of mucous membranes.
Detail Level: Zone

## 2022-11-15 NOTE — PROCEDURE: HYDRAFACIAL
Vacuum Pressure High Setting (Will Not Render If Set To 0): 16
Tip: Hydropeel Tip, Clear
Number Of Passes Step 1: 1
Consent: Written consent obtained, risks reviewed including but not limited to crusting, scabbing, blistering, scarring, darker or lighter pigmentary change, bruising, and/or incomplete response.
Location: face
Post-Care Instructions: I reviewed with the patient in detail post-care instructions. Patient should stay away from the sun and wear sun protection until treated areas are fully healed.
Solution Override
Solution: Beta-HD
Vacuum Pressure Low Setting (Will Not Render If Set To 0): 22
Number Of Passes Step 6: 0
Tip: Hydropeel Tip, Teal
Price (Use Numbers Only, No Special Characters Or $): 50
Solution: GlySal 7.5%
Indication: anti-aging
Procedure: Boost
Tip: Hydropeel Tip, Purple Aggression

## 2022-11-15 NOTE — PROCEDURE: DERMAPLANE
Pre-Procedure Text: The patient was placed in a recumbant position on the procedure table.
Post-Procedure Instructions: Following the dermaplane procedure, a hydra facial deluxe Tx administered, finishing products & SPF were applied
Treatment Areas: face
Comments: For tx on 10/13/22
Blade: 10R blade scalpel
Post-Care Instructions: I reviewed with the patient in detail post-care instructions.
Detail Level: Generalized
Treatment Area Prep Override: pca oil
Price (Use Numbers Only, No Special Characters Or $): 40

## 2022-11-15 NOTE — PROCEDURE: HYDRAFACIAL
Procedure: Boost
Vacuum Pressure High Setting (Will Not Render If Set To 0): 16
Post-Care Instructions: I reviewed with the patient in detail post-care instructions. Patient should stay away from the sun and wear sun protection until treated areas are fully healed.
Number Of Passes Step 1: 1
Location: face
Solution Override
Solution: Beta-HD
Consent: Written consent obtained, risks reviewed including but not limited to crusting, scabbing, blistering, scarring, darker or lighter pigmentary change, bruising, and/or incomplete response.
Tip: Hydropeel Tip, Clear
Tip: Hydropeel Tip, Teal
Vacuum Pressure Low Setting (Will Not Render If Set To 0): 22
Number Of Passes Step 6: 0
Price (Use Numbers Only, No Special Characters Or $): 50
Treatment Number: 2
Solution: GlySal 7.5%
Indication: anti-aging
Tip: Hydropeel Tip, Purple Aggression

## 2022-11-15 NOTE — OP THERAPY DAILY TREATMENT
"  Outpatient Physical Therapy  DAILY TREATMENT     [facility] Outpatient Physical Therapy [place] [id number] [facility], [address]  [place] [address]  Phone:  [phone]    Date: 11/15/2022    Patient: Romina Serrato  YOB: 1965  MRN: [id number]     Time Calculation                   Chief Complaint: Back Problem    Visit #: 5    SUBJECTIVE:  The patient reports having a fall on her (R) side bruising her hip and knee.    OBJECTIVE:  Current objective measures:       Increase strength in (R) LE; increase trunk rotation to the (R) side    Therapeutic Exercises (CPT 92244):     1. posterior pelvic tilts, 10x    2. bridges/ bridges with hip abduction, 2 x10 reps, xx    3. abdominal crunches with tilts, 2 x10 reps, neck pain    4. hamstring stretches, 3 x30 sec, xx    5. cat camel stretch, 5x 20 sec    6. QL stretch, 2-3'    7. piriformis stretch, 3 x30 sec    8. clamshells    9. prone hip extension    10. prone over theraball    11. quadriped UE/LE lifts, 1 x10 reps with 5 sec hold    12. prone roll outs over theraball, 10 x    13. prone planks over theraball, 4 x 30 sec    14. trunk rotations /rotations with resistance, 1 x15 reps (blue tb), (R) side    Therapeutic Treatments and Modalities:     1. Manual Therapy (CPT 19842), IASTM to the posterior glute /ITB/ TFL, (R) LE lumbar traction manual techniques 5 x20 sec on 5 sec off  Time-based treatments/modalities:     ASSESSMENT:   Response to treatment:   Manual traction to the (R) LE in supine 5 x 20 sec intervals; tolerated well with no pain or increased symptoms. Self care instructions using thera-ball for increasing trunk oblique strength with rotational components. Completed with good motor control and fatigue response. Thera-ball trunk stabilization \"rajinder-knife and pike\" positions to progressively difficult for patient to perform.      PLAN/RECOMMENDATIONS:   Plan for treatment: therapy treatment to continue next visit.  Planned interventions for next visit: " continue with current treatment.

## 2022-11-15 NOTE — PROCEDURE: CHEMICAL PEEL
Number Of Layers: 1
Treatment Number: 0
Post Peel Care: After the procedure Sun protection and post-care instructions were reviewed with the patient.
Comments: 5 mins, tolerated well, even erythema.  Likes new skincare routine very much
Consent: Prior to the procedure, written consent was obtained and risks were reviewed, including but not limited to: redness, peeling, blistering, pigmentary change, scarring, infection, and pain.
Post-Care Instructions: I reviewed with the patient in detail post-care instructions. Patient should avoid sun exposure and wear sun protection.
Price (Use Numbers Only, No Special Characters Or $): 150
Detail Level: Zone
Prep: The treated area was degreased with pre-peel cleanser, acetone and vaseline was applied for protection of mucous membranes.
Chemical Peel: Micropeel 20 Solution

## 2022-11-15 NOTE — PROCEDURE: DERMAPLANE
Price (Use Numbers Only, No Special Characters Or $): 40
Treatment Areas: face
Treatment Area Prep Override: pca oil
Post-Care Instructions: I reviewed with the patient in detail post-care instructions.
Comments: Followed by peel& trihex booster
Pre-Procedure Text: The patient was placed in a recumbant position on the procedure table.
Blade: 10R blade scalpel
Detail Level: Generalized
Post-Procedure Instructions: Following the dermaplane procedure, a hydra facial deluxe Tx administered, finishing products & SPF were applied

## 2022-11-17 ENCOUNTER — APPOINTMENT (OUTPATIENT)
Dept: PHYSICAL THERAPY | Facility: REHABILITATION | Age: 57
End: 2022-11-17
Attending: STUDENT IN AN ORGANIZED HEALTH CARE EDUCATION/TRAINING PROGRAM
Payer: COMMERCIAL

## 2022-11-28 ENCOUNTER — PHYSICAL THERAPY (OUTPATIENT)
Dept: PHYSICAL THERAPY | Facility: REHABILITATION | Age: 57
End: 2022-11-28
Attending: STUDENT IN AN ORGANIZED HEALTH CARE EDUCATION/TRAINING PROGRAM
Payer: COMMERCIAL

## 2022-11-28 DIAGNOSIS — M54.16 LUMBAR RADICULOPATHY: ICD-10-CM

## 2022-11-28 PROCEDURE — 97140 MANUAL THERAPY 1/> REGIONS: CPT

## 2022-11-28 PROCEDURE — 97112 NEUROMUSCULAR REEDUCATION: CPT

## 2022-11-28 PROCEDURE — 97110 THERAPEUTIC EXERCISES: CPT

## 2022-11-28 NOTE — OP THERAPY DAILY TREATMENT
Outpatient Physical Therapy  DAILY TREATMENT     [facility] Outpatient Physical Therapy [address] [facility], [address]  [place] [address]  Phone:  [phone]    Date: 11/28/2022    Patient: Romina Serrato  YOB: 1965  MRN: [id number]     Time Calculation               Chief Complaint: Back Problem    Visit #: 6    SUBJECTIVE:  The patient reports some soreness to the (R) hip over the last several days.    OBJECTIVE:  Current objective measures:       Decrease pain to the (R) hip; trochanteric region    Therapeutic Exercises (CPT 09811):     1. posterior pelvic tilts, 10x    2. bridges/ bridges with hip abduction, 2 x10 reps, xx    3. abdominal crunches with tilts, 2 x10 reps, neck pain    4. hamstring stretches, 3 x30 sec, xx    5. cat camel stretch, 5x 20 sec    6. QL stretch, 2-3'    7. piriformis stretch, 3 x30 sec    8. clamshells/ reverse clamshells, 1 x10 reps (blue tb) reverse (orange tb)    9. prone hip extension, 1 x 20    10. prone over theraball    11. quadriped UE/LE lifts, 1 x10 reps with 5 sec hold    12. prone roll outs over theraball, 10 x    13. prone planks over theraball, 4 x 30 sec    14. trunk rotations /rotations with resistance, 1 x15 reps (blue tb), (R) side    Therapeutic Treatments and Modalities:     1. Manual Therapy (CPT 91171), IASTM to the posterior glute /ITB/ TFL, (R) LE lumbar traction manual techniques 5 x20 sec on 5 sec off  Time-based treatments/modalities:       ASSESSMENT:   Patient's response to treatment:   IASTM to the (R) hip with moderate pressure; less soreness following intervention. Self care instruction to increase strength to glute med with resistance; patient worked to fatigue response.    PLAN/RECOMMENDATIONS:   Plan for treatment: therapy treatment to continue next visit.  Planned interventions for next visit: continue with current treatment.

## 2022-11-29 RX ORDER — MINOXIDIL 2.5 MG/1
1 TABLET ORAL QD
Qty: 30 | Refills: 3 | Status: ERX

## 2022-12-06 ENCOUNTER — PHYSICAL THERAPY (OUTPATIENT)
Dept: PHYSICAL THERAPY | Facility: REHABILITATION | Age: 57
End: 2022-12-06
Attending: STUDENT IN AN ORGANIZED HEALTH CARE EDUCATION/TRAINING PROGRAM
Payer: COMMERCIAL

## 2022-12-06 DIAGNOSIS — R20.0 NUMBNESS AND TINGLING OF RIGHT LEG: ICD-10-CM

## 2022-12-06 DIAGNOSIS — M54.16 LUMBAR RADICULOPATHY: ICD-10-CM

## 2022-12-06 DIAGNOSIS — R20.2 NUMBNESS AND TINGLING OF RIGHT LEG: ICD-10-CM

## 2022-12-06 DIAGNOSIS — R29.898 RIGHT LEG WEAKNESS: ICD-10-CM

## 2022-12-06 PROCEDURE — 97012 MECHANICAL TRACTION THERAPY: CPT

## 2022-12-06 PROCEDURE — 97112 NEUROMUSCULAR REEDUCATION: CPT

## 2022-12-06 PROCEDURE — 97110 THERAPEUTIC EXERCISES: CPT

## 2022-12-06 PROCEDURE — 97140 MANUAL THERAPY 1/> REGIONS: CPT

## 2022-12-06 NOTE — OP THERAPY DAILY TREATMENT
Outpatient Physical Therapy  DAILY TREATMENT     [facility] Outpatient Physical Therapy [place] [address] [facility], [address]  [place] [address]  Phone:  [phone]    Date: 12/06/2022    Patient: Romina Serrato  YOB: 1965  MRN: [id number]     Time Calculation    Start time: 0330  Stop time: 0415 Time Calculation (min): 45 minutes         Chief Complaint: Back Problem and Hip Problem    Visit #: 7    SUBJECTIVE:  The patient reports less pain since her previous steroid injection but continues to have weakness to the (R) LE and numbness and tingling.     OBJECTIVE:  Current objective measures:       Decrease lumbar tightness and hamstring tightness; improve strength to the multifidi and (R) LE     Therapeutic Exercises (CPT 36924):     1. posterior pelvic tilts, 10x    2. bridges/ bridges with hip abduction, 2 x10 reps, xx    3. abdominal crunches with tilts, 2 x10 reps, neck pain    4. hamstring stretches, 3 x30 sec, xx    5. cat camel stretch, 5x 20 sec    6. QL stretch, 2-3'    7. piriformis stretch, 3 x30 sec    8. clamshells/ reverse clamshells, 1 x10 reps (blue tb) reverse (orange tb)    9. prone hip extension, 1 x 20    10. prone over theraball    11. quadriped UE/LE lifts, 1 x10 reps with 5 sec hold    12. prone roll outs over theraball, 10 x    13. prone planks over theraball, 4 x 30 sec    14. trunk rotations /rotations with resistance, 1 x15 reps (blue tb), (R) side    Therapeutic Treatments and Modalities:     1. Manual Therapy (CPT 21994), IASTM to the posterior glute /ITB/ TFL, (R) LE lumbar traction manual techniques 5 x20 sec on 5 sec off  Time-based treatments/modalities:    Physical Therapy Timed Treatment Charges  Manual therapy minutes (CPT 46880): 15 minutes  Neuromusc re-ed, balance, coor, post minutes (CPT 30318): 15 minutes  Therapeutic exercise minutes (CPT 65655): 15 minutes      ASSESSMENT:   Response to treatment:   Static and contract relax techniques applied to the (R) LE  hamstring group; patient tolerated well with less tightness to the (R) LE. Self care instructions with side-lying hip abductions for activation of glute hip; tolerated well.    PLAN/RECOMMENDATIONS:   Plan for treatment: therapy treatment to continue next visit.  Planned interventions for next visit: continue with current treatment.

## 2022-12-11 NOTE — PROGRESS NOTES
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 She is unsure what started this but thinks it could've been from an accident with her Pilates reformer machine in May, a fall onto her right side in May, or a fall onto her right side on  when her dog pulled her down.  After her Pilates reformer machine accident in May, she had severe radiating pain down her posterolateral right glute down her lateral leg to her ankle that lasted approximately 4-6 weeks.       In early July she started noticing numbness radiating from her lateral right knee down to her foot. This is mildly painful. She then noticed slapping of her right foot when walking.  She endorses difficulty wearing heels due to flopping of her right foot and weakness of her right ankle dorsiflexor.  She tripped over her right foot every other day.  Endorses pain that wakes her up from sleep when she rolls onto her right side. Strength and numbness haven't changed in severity since onset.  She denies pain significantly limited by pain.  Pain severity currently 4/10.  Her symptoms do not change with any activity or positioning.     Notes that she has intentionally lost 80 lbs with diet changes since 2022 and now has been crossing her right leg over her left more.  She notices this can exacerbate the numbness and weakness in her leg.      The patient has not tried medications, injections, or physical therapy for this problem.  She denies previous history of this issue.  She does endorse remote history of right ankle fracture when she was 18 but no residual issues with numbness, ankle  stiffness, or weakness.     Medical history includes cataracts, skin cancer    HPI  Today's visit   Romina Serrato ( 1965) is a female with The primary encounter diagnosis was Foot drop, right. Diagnoses of Right leg weakness, Lumbar radiculopathy, Numbness and tingling of right leg, and Atrophy of calf muscles on right were also pertinent to this visit.    Presents today for f/u after doing some PT. she is unsure if physical therapy is helping much.  Since her last visit, she has also started acupuncture, yoga, and Pilates.  She notes that typically she is able to be mindful of her weakness and avoid falls, but around  she fell twice while walking the dog as she wasn't paying attention.     She denies radiating pain down her leg or new numbness, tingling, or weakness since her last visit.  She notes some residual focal pain at her lateral hip after falling.    Pain severity 4/10 currently    Procedure history:  - 10/4/22 Lumbar Transforaminal Epidural Steroid Injection at the  right  L4-5 levels -improvement in numbness, no change in strength, onset of slight pain afterwards, similar to her pain that she experienced at the initial onset of her symptoms      ROS:   Red Flags ROS:   Fever, Chills, Sweats: Denies  Involuntary Weight Loss: Denies  Bladder Incontinence: Denies  Bowel Incontinence: denies  Saddle Anesthesia: Denies    All other systems reviewed and negative.     PMHx:   Past Medical History:   Diagnosis Date    Cancer (HCC)        PSHx:   Past Surgical History:   Procedure Laterality Date    SD NJX AA&/STRD TFRML EPI LUMBAR/SACRAL 1 LEVEL Right 10/4/2022    Procedure: RIGHT L4-5 transforaminal epidural steroid injection with fluoroscopic guidance;  Surgeon: Eden Ness M.D.;  [facility];  Service: Pain Management    LAP, REMOVE ADJUST BOSSMAN RESTRICT D* N/A 2022    Procedure: LAPAROSCOPIC GASTRIC BAND AND PORT REMOVAL;  Surgeon: Mook Alfred  M.D.;  Location: [facility];  Service: General    GASTRIC BANDING LAPAROSCOPIC  2012    OTHER ORTHOPEDIC SURGERY  1979    right wrist surgery       Family Hx:   Family History   Problem Relation Age of Onset    Stroke Mother         TIA, carotid endarterectomy    Hypertension Mother     Diabetes Mother     Cancer Father         basal and squamous cell skin    Hyperlipidemia Father     Hypertension Father        Social Hx:  Social History     Socioeconomic History    Marital status:      Spouse name: Not on file    Number of children: Not on file    Years of education: Not on file    Highest education level: Not on file   Occupational History    Not on file   Tobacco Use    Smoking status: Never    Smokeless tobacco: Never   Vaping Use    Vaping Use: Never used   Substance and Sexual Activity    Alcohol use: Not Currently     Comment: rare    Drug use: Never    Sexual activity: Not on file   Other Topics Concern    Not on file   Social History Narrative    Not on file     Social Determinants of Health     Financial Resource Strain: Not on file   Food Insecurity: Not on file   Transportation Needs: Not on file   Physical Activity: Not on file   Stress: Not on file   Social Connections: Not on file   Intimate Partner Violence: Not on file   Housing Stability: Not on file       Allergies:  Allergies   Allergen Reactions    Caffeine Unspecified and Vomiting     migraines  migraines      Clindamycin Anaphylaxis     Throat irritation    Codeine      nausea    Erythromycin      diarrhea    Fentanyl      Other reaction(s): GI Upset  In combo with versed  In combo with versed      Oxycodone      vomitting      Penicillins Anaphylaxis    Sulfa Drugs Anaphylaxis    Versed      vomiting       Medications: reviewed on epic.   Outpatient Medications Marked as Taking for the 12/12/22 encounter (Office Visit) with Eden Ness M.D.   Medication Sig Dispense Refill    loratadine (CLARITIN) 10 MG Tab Take 10 mg by  "mouth every day.      Fexofenadine HCl (MUCINEX ALLERGY PO) Take  by mouth.      Multiple Vitamin (ONE-A-DAY ESSENTIAL PO) Take  by mouth.      Ascorbic Acid (VITAMIN C PO) Take  by mouth.      minoxidil (LONITEN) 2.5 MG Tab Take 1.25 mg by mouth every day.      cyclosporin (RESTASIS) 0.05 % ophthalmic emulsion Administer 1 Drop into both eyes 2 times a day.          Current Outpatient Medications on File Prior to Visit   Medication Sig Dispense Refill    loratadine (CLARITIN) 10 MG Tab Take 10 mg by mouth every day.      Fexofenadine HCl (MUCINEX ALLERGY PO) Take  by mouth.      Multiple Vitamin (ONE-A-DAY ESSENTIAL PO) Take  by mouth.      Ascorbic Acid (VITAMIN C PO) Take  by mouth.      minoxidil (LONITEN) 2.5 MG Tab Take 1.25 mg by mouth every day.      cyclosporin (RESTASIS) 0.05 % ophthalmic emulsion Administer 1 Drop into both eyes 2 times a day.       No current facility-administered medications on file prior to visit.         EXAMINATION     Physical Exam:   /62 (BP Location: Right arm, Patient Position: Sitting, BP Cuff Size: Adult)   Pulse 71   Temp 36.2 °C (97.2 °F) (Temporal)   Ht 1.651 m (5' 5\")   Wt 73.7 kg (162 lb 7.7 oz)   SpO2 97%     Constitutional:   Body Habitus: Body mass index is 27.04 kg/m².  Cooperation: Fully cooperates with exam  Appearance: Well-groomed, well-nourished.    Eyes: No scleral icterus to suggest severe liver disease, no proptosis to suggest severe hyperthyroidism    ENT -no obvious auditory deficits, no noticeable facial droop     Skin -no rashes or lesions noted     Respiratory-  breathing comfortably on room air, no audible wheezing    Cardiovascular-distal extremities warm and well perfused.  No lower extremity edema is noted.     Gastrointestinal - no obvious abdominal masses, non-distended    Psychiatric- alert and oriented ×3. Normal affect.     Gait -steady gait.  Feet clear the ground without difficulty.  Slight difficulty with heel walking on the right.  " No difficulty with toe walking bilaterally.     Musculoskeletal and Neuro -   Negative Tinel's at right fibular head and posterior right knee.  Negative Tinel's at left fibular head.     Thoracic/Lumbar Spine/Sacral Spine/Hips   Inspection: Slight atrophy of her right gastrocnemius muscle compared to the left       Lumbar spine /hip provocative exam maneuvers  Straight leg raise negative bilaterally  FADIR test negative bilaterally     SI joint tests  ANIKA test negative bilaterally     Key points for the international standards for neurological classification of spinal cord injury (ISNCSCI) to light touch.   Dermatome R L   L2 2 2   L3 2 2   L4 1 in calf 2   L5 1 in calf 2   S1 2 2   S2 2 2         Motor Exam Lower Extremities  ? Myotome R L   Hip flexion L2 5 5   Knee extension L3 5 5   Ankle dorsiflexion L4 4 5   Toe extension L5 4 5   Ankle plantarflexion S1 5 5      Previous exam  There is full active range of motion with lumbar extension     Facet loading maneuver negative bilaterally     Palpation:   No tenderness to palpation over the midline of lumbosacral spine, paraspinal muscles bilaterally, lumbar facets bilaterally, sacroiliac joints bilaterally, PSIS bilaterally, and greater trochanters bilaterally.     Reflexes  ?   R L   Patella   2+ 2+   Achilles    2+ 2+      Clonus of the ankle negative bilaterally         MEDICAL DECISION MAKING    Medical records review: see under HPI section.     DATA    Labs: No new labs available for review since last visit.    Lab Results   Component Value Date/Time    SODIUM 139 08/24/2022 10:08 AM    POTASSIUM 4.0 08/24/2022 10:08 AM    CHLORIDE 102 08/24/2022 10:08 AM    CO2 25 08/24/2022 10:08 AM    ANION 12.0 08/24/2022 10:08 AM    GLUCOSE 88 08/24/2022 10:08 AM    BUN 24 (H) 08/24/2022 10:08 AM    CREATININE 0.58 08/24/2022 10:08 AM    CALCIUM 9.7 08/24/2022 10:08 AM    ASTSGOT 21 06/17/2022 06:39 AM    ALTSGPT 16 06/17/2022 06:39 AM    TBILIRUBIN 0.5 06/17/2022 06:39  AM    ALBUMIN 4.4 06/17/2022 06:39 AM    TOTPROTEIN 7.4 06/17/2022 06:39 AM    GLOBULIN 3.0 06/17/2022 06:39 AM    AGRATIO 1.5 06/17/2022 06:39 AM       No results found for: PROTHROMBTM, INR     Lab Results   Component Value Date/Time    WBC 4.9 08/24/2022 10:08 AM    RBC 5.13 08/24/2022 10:08 AM    HEMOGLOBIN 15.1 08/24/2022 10:08 AM    HEMATOCRIT 44.4 08/24/2022 10:08 AM    MCV 86.5 08/24/2022 10:08 AM    MCH 29.4 08/24/2022 10:08 AM    MCHC 34.0 08/24/2022 10:08 AM    MPV 9.4 08/24/2022 10:08 AM    NEUTSPOLYS 51.50 06/17/2022 06:39 AM    LYMPHOCYTES 38.30 06/17/2022 06:39 AM    MONOCYTES 6.50 06/17/2022 06:39 AM    EOSINOPHILS 2.20 06/17/2022 06:39 AM    BASOPHILS 1.30 06/17/2022 06:39 AM        Lab Results   Component Value Date/Time    HBA1C 5.1 06/17/2022 06:39 AM        Imaging:   I personally reviewed following images, these are my reads  MRI lumbar spine 9/16/2022  Shallow broad-based disc bulge at L3-4 and L4-5.  At L3-4 there is mild bilateral neuroforaminal stenosis.  At L4-5 there is moderate central canal stenosis and right lateral recess stenosis.  There is bilateral facet arthropathy most notable at L3-4, L4-5, and L5-S1.  There is no significant central canal stenosis or neuroforaminal stenosis elsewhere. See formal radiology report for further details.     IMAGING radiology reads. I reviewed the following radiology reads   MRI lumbar spine 9/16/2022       Results for orders placed during the hospital encounter of 08/25/22     DX-LUMBAR SPINE-2 OR 3 VIEWS     Impression  No acute osseous abnormality.  Mild retrolisthesis at L5-S1, likely degenerative.  Mild to moderate degenerative change of the lumbar spine, most at L5-S1.    Diagnosis  Visit Diagnoses     ICD-10-CM   1. Foot drop, right  M21.371   2. Right leg weakness  R29.898   3. Lumbar radiculopathy  M54.16   4. Numbness and tingling of right leg  R20.0    R20.2   5. Atrophy of calf muscles on right  M62.561           ASSESSMENT AND  PLAN:  Romina Serrato (: 1965) is a female who presents with ongoing weakness of her right ankle dorsiflexor and great toe extensors with impaired sensation to light touch in the L4 and L5 distribution, likely consistent with L4 and L5 lumbar radiculopathy.  Symptoms overall stable.  Slight atrophy of her right gastrocnemius muscle compared to the left      She has negative provocative exam testing for peroneal neuropathy and lumbar radiculopathy on exam.        Romina was seen today for follow-up.    Diagnoses and all orders for this visit:    Foot drop, right  -     Referral for Orthotics  -     Referral to Neurodiagnostics (EEG,EP,EMG/NCS/DBS)    Right leg weakness  -     Referral for Orthotics  -     Referral to Neurodiagnostics (EEG,EP,EMG/NCS/DBS)    Lumbar radiculopathy  -     Referral to Neurodiagnostics (EEG,EP,EMG/NCS/DBS)    Numbness and tingling of right leg  -     Referral to Neurodiagnostics (EEG,EP,EMG/NCS/DBS)    Atrophy of calf muscles on right  -     Referral to Neurodiagnostics (EEG,EP,EMG/NCS/DBS)          PLAN  Physical Therapy: Discussed that she should continue physical therapy which should hopefully improve her strength.  Okay to continue with acupuncture, yoga, Pilates     Diagnostic workup: no new imaging needed at this time    Medications:   -I have discussed that she may continue OTC ibuprofen  -I have discussed possible prescription for gabapentin if pain becomes intolerable.  Patient would like to defer this at this time as her pain is tolerable.       Interventions:   - s/p right L4-5 transforaminal epidural steroid injection with improvement in numbness, no change in strength, onset of slight pain afterwards, similar to her pain that she experienced at the initial onset of her symptoms    Other  -Referral for EMG/NCS for further evaluation given no significant improvement after physical therapy  -Referral to orthotics for right AFO given her recent 2 falls while walking  the dog.    Follow-up: In 2 months assess progress with PT.  Discussed that if her EMG results come back sooner, I will reach out to her if she needs to act on the results in the meantime.  Discussed that if her EMG is not yet done by that time, okay to postpone follow-up appointment with me    Orders Placed This Encounter    Referral for Orthotics    Referral to Neurodiagnostics (EEG,EP,EMG/NCS/DBS)         Eden Ness MD  Interventional Pain and Spine  Physical Medicine and Rehabilitation  [facility]      The above note documents my personal evaluation of this patient. In addition, I have reviewed and confirmed with the patient and MA the supportive information documented in today's Patient Health Questionnaire and Office Note.     Please note that this dictation was created using voice recognition software. I have made every reasonable attempt to correct obvious errors, but I expect that there are errors of grammar and possibly content that I did not discover before finalizing the note.

## 2022-12-12 ENCOUNTER — OFFICE VISIT (OUTPATIENT)
Dept: PHYSICAL MEDICINE AND REHAB | Facility: MEDICAL CENTER | Age: 57
End: 2022-12-12
Payer: COMMERCIAL

## 2022-12-12 VITALS
OXYGEN SATURATION: 97 % | SYSTOLIC BLOOD PRESSURE: 102 MMHG | HEIGHT: 65 IN | DIASTOLIC BLOOD PRESSURE: 62 MMHG | TEMPERATURE: 97.2 F | WEIGHT: 162.48 LBS | BODY MASS INDEX: 27.07 KG/M2 | HEART RATE: 71 BPM

## 2022-12-12 DIAGNOSIS — R29.898 RIGHT LEG WEAKNESS: ICD-10-CM

## 2022-12-12 DIAGNOSIS — M62.561 ATROPHY OF CALF MUSCLES ON RIGHT: ICD-10-CM

## 2022-12-12 DIAGNOSIS — M54.16 LUMBAR RADICULOPATHY: ICD-10-CM

## 2022-12-12 DIAGNOSIS — M21.371 FOOT DROP, RIGHT: Primary | ICD-10-CM

## 2022-12-12 DIAGNOSIS — R20.2 NUMBNESS AND TINGLING OF RIGHT LEG: ICD-10-CM

## 2022-12-12 DIAGNOSIS — R20.0 NUMBNESS AND TINGLING OF RIGHT LEG: ICD-10-CM

## 2022-12-12 PROCEDURE — 99213 OFFICE O/P EST LOW 20 MIN: CPT | Performed by: STUDENT IN AN ORGANIZED HEALTH CARE EDUCATION/TRAINING PROGRAM

## 2022-12-12 ASSESSMENT — PATIENT HEALTH QUESTIONNAIRE - PHQ9: CLINICAL INTERPRETATION OF PHQ2 SCORE: 0

## 2022-12-12 ASSESSMENT — PAIN SCALES - GENERAL: PAINLEVEL: 4=SLIGHT-MODERATE PAIN

## 2022-12-12 ASSESSMENT — FIBROSIS 4 INDEX: FIB4 SCORE: 0.72

## 2022-12-13 ENCOUNTER — APPOINTMENT (RX ONLY)
Dept: URBAN - METROPOLITAN AREA CLINIC 36 | Facility: CLINIC | Age: 57
Setting detail: DERMATOLOGY
End: 2022-12-13

## 2022-12-13 DIAGNOSIS — Z41.9 ENCOUNTER FOR PROCEDURE FOR PURPOSES OTHER THAN REMEDYING HEALTH STATE, UNSPECIFIED: ICD-10-CM

## 2022-12-13 PROCEDURE — ? COSMETIC CONSULTATION: FRACTIONAL RESURFACING

## 2022-12-13 PROCEDURE — ? FRAXEL

## 2022-12-13 ASSESSMENT — LOCATION DETAILED DESCRIPTION DERM
LOCATION DETAILED: LEFT INFERIOR TEMPLE
LOCATION DETAILED: RIGHT INFERIOR TEMPLE

## 2022-12-13 ASSESSMENT — LOCATION SIMPLE DESCRIPTION DERM
LOCATION SIMPLE: LEFT TEMPLE
LOCATION SIMPLE: RIGHT TEMPLE

## 2022-12-13 ASSESSMENT — LOCATION ZONE DERM: LOCATION ZONE: FACE

## 2022-12-13 NOTE — PROCEDURE: FRAXEL
Location Override: spot treatment by eyes
Energy(Mj/Cm2): 20
Number Of Passes: 1
Treatment Level: 4
Total Coverage: 35%
Tip: 15mm
Price (Use Numbers Only, No Special Characters Or $): 75
Medium Metal Eye Shield Text: The ocular mucosa was anesthetized with tetracaine. Once adequate anesthesia was optained, medium metal eye shields were inserted and remained in place until the procedure was completed.
Location: decolletage of the chest
Large Plastic Eye Shield Text: The ocular mucosa was anesthetized with tetracaine. Once adequate anesthesia was optained, large plastic eye shields were inserted and remained in place until the procedure was completed.
External Cooling Fan Speed: 6
Location: dorsal forearms
Detail Level: Simple
Wavelength: 1927nm
Total Coverage: 11%
Large Metal Eye Shield Text: The ocular mucosa was anesthetized with tetracaine. Once adequate anesthesia was optained, large metal eye shields were inserted and remained in place until the procedure was completed.
Consent: Written consent obtained, risks reviewed including but not limited to pain and incomplete improvement.
Indication: photodamage
Treatment Level: 5
Add Post-Care Below To The Note: No
Location: full face
Total Coverage: 40%
External Cooling: Primo Cryo 5
Location: dorsal arms
Small Plastic Eye Shield Text: The ocular mucosa was anesthetized with tetracaine. Once adequate anesthesia was optained, small plastic eye shields were inserted and remained in place until the procedure was completed.
Post-Care Instructions: I reviewed with the patient in detail post-care instructions. Patient should avoid sun until area fully healed.
Energy(Mj/Cm2): 25
Small Metal Eye Shield Text: The ocular mucosa was anesthetized with tetracaine. Once adequate anesthesia was optained, small metal eye shields were inserted and remained in place until the procedure was completed.
Medium Plastic Eye Shield Text: The ocular mucosa was anesthetized with tetracaine. Once adequate anesthesia was optained, medium plastic eye shields were inserted and remained in place until the procedure was completed.

## 2022-12-15 ENCOUNTER — PHYSICAL THERAPY (OUTPATIENT)
Dept: PHYSICAL THERAPY | Facility: REHABILITATION | Age: 57
End: 2022-12-15
Attending: STUDENT IN AN ORGANIZED HEALTH CARE EDUCATION/TRAINING PROGRAM
Payer: COMMERCIAL

## 2022-12-15 DIAGNOSIS — R20.0 NUMBNESS AND TINGLING OF RIGHT LEG: ICD-10-CM

## 2022-12-15 DIAGNOSIS — M54.16 LUMBAR RADICULOPATHY: ICD-10-CM

## 2022-12-15 DIAGNOSIS — R20.2 NUMBNESS AND TINGLING OF RIGHT LEG: ICD-10-CM

## 2022-12-15 PROCEDURE — 97014 ELECTRIC STIMULATION THERAPY: CPT

## 2022-12-15 PROCEDURE — 97110 THERAPEUTIC EXERCISES: CPT

## 2022-12-15 PROCEDURE — 97112 NEUROMUSCULAR REEDUCATION: CPT

## 2022-12-15 PROCEDURE — 97140 MANUAL THERAPY 1/> REGIONS: CPT

## 2022-12-15 NOTE — OP THERAPY DAILY TREATMENT
Outpatient Physical Therapy  DAILY TREATMENT     [facility] Outpatient Physical Therapy [address] [facility], [address]  [place] [address]  Phone:  [phone]    Date: 12/15/2022    Patient: Romina Serrato  YOB: 1965  MRN: [id number]     Time Calculation    Start time: 1100  Stop time: 1145 Time Calculation (min): 45 minutes         Chief Complaint: Back Problem and Hip Problem    Visit #: 8    SUBJECTIVE:  The patient reports having some stiffness and soreness while taking the dog for a walk.    OBJECTIVE:  Current objective measures:       Improve mobility to the (R) hip and decrease pain to trochanteric region     Therapeutic Exercises (CPT 12357):     1. posterior pelvic tilts, 10x    2. bridges/ bridges with hip abduction, 2 x10 reps    4. hamstring stretches, 3 x30 sec    5. cat camel stretch, 5x 20 sec    6. QL stretch, 2-3'    7. piriformis stretch, 3 x30 sec    8. clamshells/ reverse clamshells, 1 x10 reps (blue tb) reverse (orange tb)    9. prone hip extension, 1 x 20    10. prone over theraball, 2 x10 reps (blue tb)    11. quadriped UE/LE lifts, 1 x10 reps with 5 sec hold    12. prone roll outs over theraball, 10 x    13. prone planks over theraball, 4 x 30 sec    14. trunk rotations /rotations with resistance, 1 x15 reps (blue tb), (R) side    Therapeutic Treatments and Modalities:     1. Manual Therapy (CPT 31686), IASTM to the posterior glute /ITB/ TFL, (R) LE lumbar traction manual techniques 5 x20 sec on 5 sec off    3. Manual Therapy (CPT 52932), Lumbar, Lumbar traction intermittent #82/60 at 60/20 sec on/off with MHP for 15 minutes  Time-based treatments/modalities:    Physical Therapy Timed Treatment Charges  Manual therapy minutes (CPT 70556): 15 minutes  Neuromusc re-ed, balance, coor, post minutes (CPT 27291): 15 minutes  Therapeutic exercise minutes (CPT 62764): 15 minutesASSESSMENT:     Response to treatment:   IASTM to the (R) hip trochanteric area; moderate pressure using  the Hawk  tool. Patient reports continued tenderness and soreness with pressure over the proximal to distal region of the TFL as well. Performed manual traction to the (R) LE with intermittent manual pressure; patient responded well to treatment; less tingling numbness.      PLAN/RECOMMENDATIONS:   Plan for treatment: therapy treatment to continue next visit.  Planned interventions for next visit: continue with current treatment.

## 2022-12-19 ENCOUNTER — TELEPHONE (OUTPATIENT)
Dept: PHYSICAL MEDICINE AND REHAB | Facility: MEDICAL CENTER | Age: 57
End: 2022-12-19

## 2022-12-19 ENCOUNTER — PHYSICAL THERAPY (OUTPATIENT)
Dept: PHYSICAL THERAPY | Facility: REHABILITATION | Age: 57
End: 2022-12-19
Attending: STUDENT IN AN ORGANIZED HEALTH CARE EDUCATION/TRAINING PROGRAM
Payer: COMMERCIAL

## 2022-12-19 DIAGNOSIS — M54.16 LUMBAR RADICULOPATHY: ICD-10-CM

## 2022-12-19 DIAGNOSIS — R20.2 NUMBNESS AND TINGLING OF RIGHT LEG: ICD-10-CM

## 2022-12-19 DIAGNOSIS — R20.0 NUMBNESS AND TINGLING OF RIGHT LEG: ICD-10-CM

## 2022-12-19 PROCEDURE — 97112 NEUROMUSCULAR REEDUCATION: CPT

## 2022-12-19 PROCEDURE — 97110 THERAPEUTIC EXERCISES: CPT

## 2022-12-19 PROCEDURE — 97140 MANUAL THERAPY 1/> REGIONS: CPT

## 2022-12-19 NOTE — OP THERAPY DAILY TREATMENT
utpatient Physical Therapy  DAILY TREATMENT     [facility] Outpatient Physical Therapy [address], [address]  [place] [address]  Phone:  [phone]    Date: 12/19/2022    Patient: Romina Serrato  YOB: 1965  MRN: [id number]     Time Calculation    Start time: 0900  Stop time: 0945 Time Calculation (min): 45 minutes         Chief Complaint: Back Problem and Hip Problem    Visit #: 9    SUBJECTIVE:  The patient reports getting an AFO for her (R) foot for long distance walking; she has been using it to walk the dog    OBJECTIVE:  Current objective measures:       Improve strength to the lumbar multifidi; decrease tightness to he (R) hip     Therapeutic Exercises (CPT 22154):     1. posterior pelvic tilts, 10x    2. bridges/ bridges with hip abduction, 2 x10 reps    4. hamstring stretches, 3 x30 sec    5. cat camel stretch, 5x 20 sec    6. QL stretch, 2-3'    7. piriformis stretch, 3 x30 sec    8. clamshells/ reverse clamshells, 1 x10 reps (blue tb) reverse (orange tb)    9. prone hip extension, 1 x 20    10. prone over theraball    11. quadriped UE/LE lifts, 1 x10 reps with 5 sec hold    12. prone roll outs over theraball, 10 x    13. prone planks over theraball, 4 x 30 sec    14. trunk rotations /rotations with resistance, 1 x15 reps (blue tb), (R) side    Therapeutic Treatments and Modalities:     1. Manual Therapy (CPT 61401), IASTM to the posterior glute /ITB/ TFL, (R) LE lumbar traction manual techniques 5 x20 sec on 5 sec off    3. Manual Therapy (CPT 06385), Lumbar, Lumbar traction intermittent #89/68 at 60/20 sec on/off with MHP for 15 minutes  Time-based treatments/modalities:    Physical Therapy Timed Treatment Charges  Manual therapy minutes (CPT 02948): 15 minutes  Neuromusc re-ed, balance, coor, post minutes (CPT 55396): 15 minutes  Therapeutic exercise minutes (CPT 26560): 15 minutes  ASSESSMENT:   Response to treatment:   IASTM to the (R) hip from proximal to distal piriformis to  TFL /ITB; less tender; Self care instructions for strengthening lumbar multifidi using light resistance      PLAN/RECOMMENDATIONS:   Plan for treatment: therapy treatment to continue next visit.  Planned interventions for next visit: continue with current treatment.

## 2022-12-20 NOTE — TELEPHONE ENCOUNTER
Caller Name: Romina Serrato  Call Back Number:     How would the patient prefer to be contacted with a response: Phone call OK to leave a detailed message    I spoke with pt Romina regarding her physical therapy preference. She did not know why [facility] had contacted her and was content with [facility]. She has already begun PT at [facility] and prefers to stay in the [facility] program.

## 2022-12-20 NOTE — TELEPHONE ENCOUNTER
Not sure. It seems like she is going to PT here at [facility]. If she intends to switch physical therapists, I can place a referral to a different location.

## 2022-12-20 NOTE — TELEPHONE ENCOUNTER
"Caller Name: Marcella  Call Back Number: [phone]    How would the patient prefer to be contacted with a response: Phone call OK to leave a detailed message    \"Marcella\" from [facility] called asking if they were supposed to receive a referral for PT for this pt because she was confused by the referral that she received. It does'nt appear that any referral was sent directly to [facility]. Just trying to clarify.    [facility] Fax - [phone]  "

## 2022-12-28 ENCOUNTER — NON-PROVIDER VISIT (OUTPATIENT)
Dept: NEUROLOGY | Facility: MEDICAL CENTER | Age: 57
End: 2022-12-28
Attending: SPECIALIST
Payer: COMMERCIAL

## 2022-12-28 ENCOUNTER — APPOINTMENT (OUTPATIENT)
Dept: PHYSICAL THERAPY | Facility: REHABILITATION | Age: 57
End: 2022-12-28
Attending: STUDENT IN AN ORGANIZED HEALTH CARE EDUCATION/TRAINING PROGRAM
Payer: COMMERCIAL

## 2022-12-28 DIAGNOSIS — R20.2 NUMBNESS AND TINGLING: ICD-10-CM

## 2022-12-28 DIAGNOSIS — M62.569 ATROPHY OF CALF MUSCLES: ICD-10-CM

## 2022-12-28 DIAGNOSIS — M21.371 FOOT DROP, RIGHT: ICD-10-CM

## 2022-12-28 DIAGNOSIS — R20.0 NUMBNESS AND TINGLING: ICD-10-CM

## 2022-12-28 PROCEDURE — 95886 MUSC TEST DONE W/N TEST COMP: CPT | Performed by: PSYCHIATRY & NEUROLOGY

## 2022-12-28 PROCEDURE — 95910 NRV CNDJ TEST 7-8 STUDIES: CPT | Performed by: PSYCHIATRY & NEUROLOGY

## 2022-12-28 PROCEDURE — 95886 MUSC TEST DONE W/N TEST COMP: CPT | Mod: 26 | Performed by: PSYCHIATRY & NEUROLOGY

## 2022-12-28 PROCEDURE — 95910 NRV CNDJ TEST 7-8 STUDIES: CPT | Mod: 26 | Performed by: PSYCHIATRY & NEUROLOGY

## 2022-12-28 NOTE — PROCEDURES
"NERVE CONDUCTION STUDIES AND ELECTROMYOGRAPHY REPORT  [facility]  12/28/22          IMPRESSION:  This is an abnormal study.  There is electrophysiologic evidence consistent with a chronic right L4 radiculopathy.  Clinical correlation recommended.      Amelia Murray MD  Neurology - Neurophysiology              REASON FOR REFERRAL:  Ms. Romina Serrato 57 y.o. referred by Dr. Eden Ness of right foot drop.  Symptoms have been present for at least 6 months.  There is associated numbness in the lateral right leg radiating distally.     Height: 5'5\"  Weight: 157 lbs    Symptom focused neurological exam shows 4/5 right and 5/5 left foot dorsiflexion strength.  Right foot inversion and eversion are 5 out of 5 bilaterally.      ELECTRODIAGNOSTIC EXAMINATION:  Nerve conduction studies (NCS) and electromyography (EMG) are utilized to evaluate direct or indirect damage to the peripheral nervous system. NCS are performed to measure the nerve(s) response(s) to electrostimulation across a given nerve segment. EMG evaluates the passive and active electrical activity of the muscle(s) in question.  Muscles are innervated by specific peripheral nerves and roots. Often times, several nerves the muscle to be examined in order to determine the presence or absence of the disease process. Furthermore, nerves and muscles may need to be tested in a [id number] comparison, as well as in additional extremities, as this may be crucial in characterizing the extent of the disease process, which may be diffuse or isolated and of varying degree of severity. The extent of the neurodiagnostic exam is justified as it may help arrive to a proper diagnosis, which ultimately may contribute to better management of the patient. Therefore, the nerves to muscles examined during the study were medically necessary.    Unless otherwise noted, temperature of the extremity(s) study was monitored before and during the examination " and remained between 32 and 36 degrees C for the upper extremities, and between 30 and 36 degrees C for the lower extremities. The patient tolerated testing well, without any complications.       NERVE CONDUCTION STUDY SUMMARY:  Selected nerves of the bilateral lower extremity are studied.    Normal bilateral superficial fibular sensory responses.  Normal right sural sensory response.  Normal right common peroneal motor response at the EDB.  Normal bilateral common peroneal motor responses at the TA.  Normal right tibial motor response at the AH.  Normal right common peroneal and tibial F waves.        NEEDLE EMG SUMMARY:  Concentric needle study of selected right lower extremity and lower lumbar paraspinal muscles is performed.     Insertion activity is minimally increased in the right tibialis anterior due to presence of fibrillation potentials and positive waves.    Large amplitude prolonged duration motor unit potentials appreciated in the right tibialis anterior and vastus lateralis.  Otherwise with activation, there are normal morphology (amplitude/duration) motor unit action potentials firing with normal recruitment in remaining muscles tested.       PATIENT DATA TABLES  Nerve Conduction Studies     Stim Site NR Onset (ms) Norm Onset (ms) O-P Amp (µV) Norm O-P Amp Site1 Site2 Delta-P (ms) Dist (cm) Roland (m/s) Norm Roland (m/s)   Left Sup Fibular Anti Sensory (Ant Lat Mall)   14 cm    2.9  8.4 >5.0 14 cm Ant Lat Mall 4.4 14.0 32 >32   Right Sup Fibular Anti Sensory (Ant Lat Mall)   14 cm    3.1  9.3 >5.0 14 cm Ant Lat Mall 4.2 14.0 33 >32       3.4  7.0          Right Sural Anti Sensory (Lat Mall)   Calf    3.2 <4.6 10.3 >4 Calf Lat Mall 3.8 14.0 *37 >40        Stim Site NR Onset (ms) Norm Onset (ms) O-P Amp (mV) Norm O-P Amp Site1 Site2 Delta-0 (ms) Dist (cm) Roland (m/s) Norm Roland (m/s)   Right Peroneal EDB Motor (Ext Dig Brev)   Ankle    4.8 <6 4.6 >2.5 B Fib Ankle 6.5 31.5 48 >40   B Fib    11.3  4.4  Poplt B Fib  2.0 10.0 50    Poplt    13.3  2.9          Left Peroneal TA Motor (AntTibialis)   Fib Head    2.5 <4.5 6.3 >3 Poplit Fib Head 1.3 10.0 77 >40   Poplit    3.8  6.3          Right Peroneal TA Motor (AntTibialis)   Fib Head    3.6 <4.5 5.2 >3 Poplit Fib Head 1.1 10.0 91 >40   Poplit    4.7  4.7          Right Tibial Motor (Abd Breen Brev)   Ankle    3.6 <6 8.6 >4 Knee Ankle 7.7 37.5 49 >40   Knee    11.3  6.3            F Wave Studies     NR F-Lat (ms) Lat Norm (ms)   Right Peroneal EDB (Ext Dig Brev)      52.21 <57   Right Tibial (Abd Hallucis)      51.81 <57                              Electromyography     Side Muscle Nerve Root Ins Act Fibs Psw Amp Dur Poly Recrt Int Pat Comment   Right AntTibialis Dp Br Fibular L4-5 *Incr *1+ *1+ *Incr *>12ms 0 *Reduced *75% Trace fibs   Right Gastroc Tibial S1-2 Nml Nml Nml Nml Nml 0 Nml Nml    Right VastusLat Femoral L2-4 Nml Nml Nml *Incr *>12ms 0 Nml Nml    Right GluteusMed SupGluteal L5-S1 Nml Nml Nml Nml Nml 0 Nml Nml    Right Lumbo Parasp Low Rami L5-S1 Nml Nml Nml

## 2023-01-04 ENCOUNTER — PHYSICAL THERAPY (OUTPATIENT)
Dept: PHYSICAL THERAPY | Facility: REHABILITATION | Age: 58
End: 2023-01-04
Attending: STUDENT IN AN ORGANIZED HEALTH CARE EDUCATION/TRAINING PROGRAM
Payer: COMMERCIAL

## 2023-01-04 DIAGNOSIS — R20.0 NUMBNESS AND TINGLING OF RIGHT LEG: ICD-10-CM

## 2023-01-04 DIAGNOSIS — M54.16 LUMBAR RADICULOPATHY: ICD-10-CM

## 2023-01-04 DIAGNOSIS — R20.2 NUMBNESS AND TINGLING OF RIGHT LEG: ICD-10-CM

## 2023-01-04 DIAGNOSIS — R29.898 RIGHT LEG WEAKNESS: ICD-10-CM

## 2023-01-04 PROCEDURE — 97112 NEUROMUSCULAR REEDUCATION: CPT

## 2023-01-04 PROCEDURE — 97110 THERAPEUTIC EXERCISES: CPT

## 2023-01-04 PROCEDURE — 97012 MECHANICAL TRACTION THERAPY: CPT

## 2023-01-04 NOTE — OP THERAPY DAILY TREATMENT
Outpatient Physical Therapy  DAILY TREATMENT     [facility] Outpatient Physical Therapy [place] [address] [facility], [address]  [place] [address]  Phone:  [phone]    Date: 01/04/2023    Patient: Romina Serrato  YOB: 1965  MRN: [id number]     Time Calculation    Start time: 0345  Stop time: 0430 Time Calculation (min): 45 minutes         Chief Complaint: Back Problem    Visit #: 10    SUBJECTIVE:  The patient reports getting her EMG done on the 28th of Dec. Received the results concerning her (R) LE condition.    OBJECTIVE:  Current objective measures:       Improve (R) leg strength in functional movements     Therapeutic Exercises (CPT 49895):     1. posterior pelvic tilts, 10x    2. bridges/ bridges with hip abduction, 2 x10 reps    4. hamstring stretches, 3 x30 sec    5. cat camel stretch, 5x 20 sec    6. QL stretch, 2-3'    7. piriformis stretch, 3 x30 sec    8. clamshells/ reverse clamshells, 1 x10 reps (blue tb) reverse (orange tb)    9. prone hip extension, 1 x 20    10. prone over theraball    11. quadriped UE/LE lifts, 1 x10 reps with 5 sec hold    12. prone roll outs over theraball, 10 x    13. prone planks over theraball, 4 x 30 sec    14. trunk rotations /rotations with resistance, 1 x15 reps (blue tb), (R) side    15. 3 way hip, (R) side    16. leg extension    17. lunges    Therapeutic Treatments and Modalities:     1. Manual Therapy (CPT 53182), IASTM to the posterior glute /ITB/ TFL, (R) LE lumbar traction manual techniques 5 x20 sec on 5 sec off    3. Manual Therapy (CPT 40039), Lumbar, Lumbar traction intermittent #89/68 at 60/20 sec on/off with MHP for 15 minutes  Time-based treatments/modalities:    Physical Therapy Timed Treatment Charges  Neuromusc re-ed, balance, coor, post minutes (CPT 26086): 15 minutes  Therapeutic exercise minutes (CPT 48123): 15 minutesASSESSMENT:   Response to treatment:   Discussed with patient her known results with EMG that she shared; recommended  continued lumbar traction and self care instruction for home traction use of comScore device in future for continued treatment for HEP. Patient performed strengthening exercises and trunk stabilization exercises as tolerated to fatigue response.       PLAN/RECOMMENDATIONS:   Plan for treatment: therapy treatment to continue next visit.  Planned interventions for next visit: continue with current treatment.

## 2023-01-09 ENCOUNTER — PATIENT MESSAGE (OUTPATIENT)
Dept: MEDICAL GROUP | Facility: LAB | Age: 58
End: 2023-01-09
Payer: COMMERCIAL

## 2023-01-09 ENCOUNTER — PHYSICAL THERAPY (OUTPATIENT)
Dept: PHYSICAL THERAPY | Facility: REHABILITATION | Age: 58
End: 2023-01-09
Attending: STUDENT IN AN ORGANIZED HEALTH CARE EDUCATION/TRAINING PROGRAM
Payer: COMMERCIAL

## 2023-01-09 DIAGNOSIS — R20.0 NUMBNESS AND TINGLING OF RIGHT LEG: ICD-10-CM

## 2023-01-09 DIAGNOSIS — M54.16 LUMBAR RADICULOPATHY: ICD-10-CM

## 2023-01-09 DIAGNOSIS — R20.2 NUMBNESS AND TINGLING OF RIGHT LEG: ICD-10-CM

## 2023-01-09 DIAGNOSIS — R29.898 RIGHT LEG WEAKNESS: ICD-10-CM

## 2023-01-09 PROCEDURE — 97110 THERAPEUTIC EXERCISES: CPT

## 2023-01-09 PROCEDURE — 97112 NEUROMUSCULAR REEDUCATION: CPT

## 2023-01-09 PROCEDURE — 97140 MANUAL THERAPY 1/> REGIONS: CPT

## 2023-01-09 PROCEDURE — 97012 MECHANICAL TRACTION THERAPY: CPT

## 2023-01-09 RX ORDER — AZITHROMYCIN 500 MG/1
500 TABLET, FILM COATED ORAL DAILY
Qty: 5 TABLET | Refills: 0 | Status: SHIPPED | OUTPATIENT
Start: 2023-01-09 | End: 2023-01-14

## 2023-01-09 NOTE — OP THERAPY DAILY TREATMENT
Outpatient Physical Therapy  DAILY TREATMENT     [facility] Outpatient Physical Therapy [place] [address] [address] [facility], [address]  [place] [address]  Phone:  [phone]    Date: 01/09/2023    Patient: Romina Serrato  YOB: 1965  MRN: [id number]     Time Calculation    Start time: 0345  Stop time: 0430 Time Calculation (min): 45 minutes         Chief Complaint: Back Problem    Visit #: 11    SUBJECTIVE:  The patient reports continues to have numbness tingling and weakness to the (R) LE     OBJECTIVE:  Current objective measures:       Improve (R) LE strength in quads/ hamstring and glutes    Therapeutic Exercises (CPT 86206):     1. posterior pelvic tilts, 10x    2. bridges/ bridges with hip abduction, 2 x10 reps    3. leg press    4. hamstring stretches, 3 x30 sec    5. cat camel stretch, 5x 20 sec    6. QL stretch, 2-3'    7. piriformis stretch, 3 x30 sec    8. clamshells/ reverse clamshells, 1 x10 reps (blue tb) reverse (orange tb)    9. prone hip extension, 1 x 20    10. prone over theraball, 2-3'    11. quadriped UE/LE lifts, 1 x10 reps with 5 sec hold    12. prone roll outs over theraball, 10 x    13. prone planks over theraball, 4 x 30 sec    14. trunk rotations /rotations with resistance, 1 x15 reps (blue tb), (R) side    15. 3 way hip, (R) side    16. leg extension    17. lunges    Therapeutic Treatments and Modalities:     1. Manual Therapy (CPT 56716), IASTM to the posterior glute /ITB/ TFL, (R) LE lumbar traction manual techniques 5 x20 sec on 5 sec off    3. Manual Therapy (CPT 86335), Lumbar, Lumbar traction intermittent #89/68 at 60/20 sec on/off with MHP for 15 minutes  Time-based treatments/modalities:    Physical Therapy Timed Treatment Charges  Manual therapy minutes (CPT 53084): 10 minutes  Neuromusc re-ed, balance, coor, post minutes (CPT 81515): 15 minutes  Therapeutic exercise minutes (CPT 44687): 20 minutes    ASSESSMENT:   Response to treatment:   Manual therapy techniques provided to  decrease hamstring tightness bilaterally (R > (L) side. The patient continues to have tightness to the (R) side pf the hamstring group. Self care instructions for use of thera-ball for lumbar traction. The patient tolerated well.       PLAN/RECOMMENDATIONS:   Plan for treatment: therapy treatment to continue next visit.  Planned interventions for next visit: continue with current treatment.

## 2023-01-11 ENCOUNTER — APPOINTMENT (OUTPATIENT)
Dept: PHYSICAL THERAPY | Facility: REHABILITATION | Age: 58
End: 2023-01-11
Attending: STUDENT IN AN ORGANIZED HEALTH CARE EDUCATION/TRAINING PROGRAM
Payer: COMMERCIAL

## 2023-01-18 ENCOUNTER — APPOINTMENT (OUTPATIENT)
Dept: PHYSICAL THERAPY | Facility: REHABILITATION | Age: 58
End: 2023-01-18
Attending: STUDENT IN AN ORGANIZED HEALTH CARE EDUCATION/TRAINING PROGRAM
Payer: COMMERCIAL

## 2023-01-19 ENCOUNTER — APPOINTMENT (OUTPATIENT)
Dept: PHYSICAL THERAPY | Facility: REHABILITATION | Age: 58
End: 2023-01-19
Attending: STUDENT IN AN ORGANIZED HEALTH CARE EDUCATION/TRAINING PROGRAM
Payer: COMMERCIAL

## 2023-01-20 ENCOUNTER — APPOINTMENT (OUTPATIENT)
Dept: PHYSICAL THERAPY | Facility: REHABILITATION | Age: 58
End: 2023-01-20
Attending: STUDENT IN AN ORGANIZED HEALTH CARE EDUCATION/TRAINING PROGRAM
Payer: COMMERCIAL

## 2023-01-30 DIAGNOSIS — Z12.31 ENCOUNTER FOR SCREENING MAMMOGRAM FOR MALIGNANT NEOPLASM OF BREAST: ICD-10-CM

## 2023-01-31 ENCOUNTER — RESEARCH ENCOUNTER (OUTPATIENT)
Dept: RESEARCH | Facility: WORKSITE | Age: 58
End: 2023-01-31
Payer: COMMERCIAL

## 2023-01-31 DIAGNOSIS — Z00.6 RESEARCH STUDY PATIENT: ICD-10-CM

## 2023-02-01 ENCOUNTER — HOSPITAL ENCOUNTER (OUTPATIENT)
Dept: RADIOLOGY | Facility: MEDICAL CENTER | Age: 58
End: 2023-02-01
Attending: FAMILY MEDICINE
Payer: COMMERCIAL

## 2023-02-01 DIAGNOSIS — Z12.31 ENCOUNTER FOR SCREENING MAMMOGRAM FOR MALIGNANT NEOPLASM OF BREAST: ICD-10-CM

## 2023-02-01 PROCEDURE — 77063 BREAST TOMOSYNTHESIS BI: CPT

## 2023-02-10 ENCOUNTER — HOSPITAL ENCOUNTER (OUTPATIENT)
Dept: RADIOLOGY | Facility: MEDICAL CENTER | Age: 58
End: 2023-02-10
Payer: COMMERCIAL

## 2023-02-15 ENCOUNTER — APPOINTMENT (RX ONLY)
Dept: URBAN - METROPOLITAN AREA CLINIC 36 | Facility: CLINIC | Age: 58
Setting detail: DERMATOLOGY
End: 2023-02-15

## 2023-02-15 DIAGNOSIS — Z41.9 ENCOUNTER FOR PROCEDURE FOR PURPOSES OTHER THAN REMEDYING HEALTH STATE, UNSPECIFIED: ICD-10-CM

## 2023-02-15 PROCEDURE — ? FRAXEL

## 2023-02-15 ASSESSMENT — LOCATION ZONE DERM: LOCATION ZONE: FACE

## 2023-02-15 ASSESSMENT — LOCATION DETAILED DESCRIPTION DERM: LOCATION DETAILED: LEFT SUPERIOR LATERAL MALAR CHEEK

## 2023-02-15 ASSESSMENT — LOCATION SIMPLE DESCRIPTION DERM: LOCATION SIMPLE: LEFT CHEEK

## 2023-02-15 NOTE — PROCEDURE: FRAXEL
Location Override: spot treatment by eyes
Energy(Mj/Cm2): 20
Number Of Passes: 1
Treatment Level: 4
Total Coverage: 35%
Tip: 15mm
Price (Use Numbers Only, No Special Characters Or $): 79
Medium Metal Eye Shield Text: The ocular mucosa was anesthetized with tetracaine. Once adequate anesthesia was optained, medium metal eye shields were inserted and remained in place until the procedure was completed.
Location: decolletage of the chest
Large Plastic Eye Shield Text: The ocular mucosa was anesthetized with tetracaine. Once adequate anesthesia was optained, large plastic eye shields were inserted and remained in place until the procedure was completed.
External Cooling Fan Speed: 6
Location: dorsal forearms
Detail Level: Simple
Wavelength: 1927nm
Total Coverage: 11%
Large Metal Eye Shield Text: The ocular mucosa was anesthetized with tetracaine. Once adequate anesthesia was optained, large metal eye shields were inserted and remained in place until the procedure was completed.
Consent: Written consent obtained, risks reviewed including but not limited to pain and incomplete improvement.
Indication: photodamage
Add Post-Care Below To The Note: No
Tip: 7mm
Location: full face
Total Coverage: 45%
External Cooling: Primo Cryo 5
Treatment Number: 2
Location: dorsal arms
Small Plastic Eye Shield Text: The ocular mucosa was anesthetized with tetracaine. Once adequate anesthesia was optained, small plastic eye shields were inserted and remained in place until the procedure was completed.
Post-Care Instructions: I reviewed with the patient in detail post-care instructions. Patient should avoid sun until area fully healed.
Energy(Mj/Cm2): 25
Small Metal Eye Shield Text: The ocular mucosa was anesthetized with tetracaine. Once adequate anesthesia was optained, small metal eye shields were inserted and remained in place until the procedure was completed.
Medium Plastic Eye Shield Text: The ocular mucosa was anesthetized with tetracaine. Once adequate anesthesia was optained, medium plastic eye shields were inserted and remained in place until the procedure was completed.
Location: left cheek

## 2023-02-24 ENCOUNTER — APPOINTMENT (OUTPATIENT)
Dept: PHYSICAL MEDICINE AND REHAB | Facility: MEDICAL CENTER | Age: 58
End: 2023-02-24
Payer: COMMERCIAL

## 2023-02-24 NOTE — PROGRESS NOTES
Follow-up patient Note    Interventional Pain and Spine  Physiatry (Physical Medicine and Rehabilitation)     Patient Name: Romina Serrato  : 1965  Date of service: 2023    Chief Complaint:   No chief complaint on file.        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 She is unsure what started this but thinks it could've been from an accident with her Pilates reformer machine in May, a fall onto her right side in May, or a fall onto her right side on  when her dog pulled her down.  After her Pilates reformer machine accident in May, she had severe radiating pain down her posterolateral right glute down her lateral leg to her ankle that lasted approximately 4-6 weeks.       In early July she started noticing numbness radiating from her lateral right knee down to her foot. This is mildly painful. She then noticed slapping of her right foot when walking.  She endorses difficulty wearing heels due to flopping of her right foot and weakness of her right ankle dorsiflexor.  She tripped over her right foot every other day.  Endorses pain that wakes her up from sleep when she rolls onto her right side. Strength and numbness haven't changed in severity since onset.  She denies pain significantly limited by pain.  Pain severity currently 4/10.  Her symptoms do not change with any activity or positioning.     Notes that she has intentionally lost 80 lbs with diet changes since 2022 and now has been crossing her right leg over her left more.  She notices this can exacerbate the numbness and weakness in her leg.      The patient has not tried medications, injections, or physical therapy for this problem.  She denies previous history of this issue.  She does endorse remote history of right ankle fracture when she was 18 but no residual issues with numbness, ankle stiffness, or weakness.     Medical history  includes cataracts, skin cancer    HPI  Today's visit   Romina Serrato ( 1965) is a female with There were no encounter diagnoses.    Presents today for f/u after doing some PT. she is unsure if physical therapy is helping much.  Since her last visit, she has also started acupuncture, yoga, and Pilates.  She notes that typically she is able to be mindful of her weakness and avoid falls, but around Thanksgiving she fell twice while walking the dog as she wasn't paying attention.     She denies radiating pain down her leg or new numbness, tingling, or weakness since her last visit.  She notes some residual focal pain at her lateral hip after falling.    Pain severity 4/10 currently    Procedure history:  - 10/4/22 Lumbar Transforaminal Epidural Steroid Injection at the  right  L4-5 levels -improvement in numbness, no change in strength, onset of slight pain afterwards, similar to her pain that she experienced at the initial onset of her symptoms      ROS:   Red Flags ROS:   Fever, Chills, Sweats: Denies  Involuntary Weight Loss: Denies  Bladder Incontinence: Denies  Bowel Incontinence: denies  Saddle Anesthesia: Denies    All other systems reviewed and negative.     PMHx:   Past Medical History:   Diagnosis Date    Cancer (HCC)        PSHx:   Past Surgical History:   Procedure Laterality Date    VA NJX AA&/STRD TFRML EPI LUMBAR/SACRAL 1 LEVEL Right 10/4/2022    Procedure: RIGHT L4-5 transforaminal epidural steroid injection with fluoroscopic guidance;  Surgeon: Eden Ness M.D.;  [facility];  Service: Pain Management    LAP, REMOVE ADJUST BOSSMAN RESTRICT D* N/A 2022    Procedure: LAPAROSCOPIC GASTRIC BAND AND PORT REMOVAL;  Surgeon: Mook Alfred M.D.;  Location: SURGERY [facility];  Service: General    GASTRIC BANDING LAPAROSCOPIC  2012    OTHER ORTHOPEDIC SURGERY      right wrist surgery       Family Hx:   Family History   Problem Relation Age of Onset    Stroke  Mother         TIA, carotid endarterectomy    Hypertension Mother     Diabetes Mother     Cancer Father         basal and squamous cell skin    Hyperlipidemia Father     Hypertension Father        Social Hx:  Social History     Socioeconomic History    Marital status:      Spouse name: Not on file    Number of children: Not on file    Years of education: Not on file    Highest education level: Not on file   Occupational History    Not on file   Tobacco Use    Smoking status: Never    Smokeless tobacco: Never   Vaping Use    Vaping Use: Never used   Substance and Sexual Activity    Alcohol use: Not Currently     Comment: rare    Drug use: Never    Sexual activity: Not on file   Other Topics Concern    Not on file   Social History Narrative    Not on file     Social Determinants of Health     Financial Resource Strain: Not on file   Food Insecurity: Not on file   Transportation Needs: Not on file   Physical Activity: Not on file   Stress: Not on file   Social Connections: Not on file   Intimate Partner Violence: Not on file   Housing Stability: Not on file       Allergies:  Allergies   Allergen Reactions    Caffeine Unspecified and Vomiting     migraines  migraines      Clindamycin Anaphylaxis     Throat irritation    Codeine      nausea    Erythromycin      diarrhea    Fentanyl      Other reaction(s): GI Upset  In combo with versed  In combo with versed      Oxycodone      vomitting      Penicillins Anaphylaxis    Sulfa Drugs Anaphylaxis    Versed      vomiting       Medications: reviewed on epic.   No outpatient medications have been marked as taking for the 2/24/23 encounter (Appointment) with Eden Ness M.D..        Current Outpatient Medications on File Prior to Visit   Medication Sig Dispense Refill    loratadine (CLARITIN) 10 MG Tab Take 10 mg by mouth every day.      Fexofenadine HCl (MUCINEX ALLERGY PO) Take  by mouth.      Multiple Vitamin (ONE-A-DAY ESSENTIAL PO) Take  by mouth.      Ascorbic  Acid (VITAMIN C PO) Take  by mouth.      minoxidil (LONITEN) 2.5 MG Tab Take 1.25 mg by mouth every day.      cyclosporin (RESTASIS) 0.05 % ophthalmic emulsion Administer 1 Drop into both eyes 2 times a day.       No current facility-administered medications on file prior to visit.         EXAMINATION     Physical Exam:   There were no vitals taken for this visit.    Constitutional:   Body Habitus: There is no height or weight on file to calculate BMI.  Cooperation: Fully cooperates with exam  Appearance: Well-groomed, well-nourished.    Eyes: No scleral icterus to suggest severe liver disease, no proptosis to suggest severe hyperthyroidism    ENT -no obvious auditory deficits, no noticeable facial droop     Skin -no rashes or lesions noted     Respiratory-  breathing comfortably on room air, no audible wheezing    Cardiovascular-distal extremities warm and well perfused.  No lower extremity edema is noted.     Gastrointestinal - no obvious abdominal masses, non-distended    Psychiatric- alert and oriented ×3. Normal affect.     Gait -steady gait.  Feet clear the ground without difficulty.  Slight difficulty with heel walking on the right.  No difficulty with toe walking bilaterally.     Musculoskeletal and Neuro -   Negative Tinel's at right fibular head and posterior right knee.  Negative Tinel's at left fibular head.     Thoracic/Lumbar Spine/Sacral Spine/Hips   Inspection: Slight atrophy of her right gastrocnemius muscle compared to the left       Lumbar spine /hip provocative exam maneuvers  Straight leg raise negative bilaterally  FADIR test negative bilaterally     SI joint tests  ANIKA test negative bilaterally     Key points for the international standards for neurological classification of spinal cord injury (ISNCSCI) to light touch.   Dermatome R L   L2 2 2   L3 2 2   L4 1 in calf 2   L5 1 in calf 2   S1 2 2   S2 2 2         Motor Exam Lower Extremities  ? Myotome R L   Hip flexion L2 5 5   Knee extension  L3 5 5   Ankle dorsiflexion L4 4 5   Toe extension L5 4 5   Ankle plantarflexion S1 5 5      Previous exam  There is full active range of motion with lumbar extension     Facet loading maneuver negative bilaterally     Palpation:   No tenderness to palpation over the midline of lumbosacral spine, paraspinal muscles bilaterally, lumbar facets bilaterally, sacroiliac joints bilaterally, PSIS bilaterally, and greater trochanters bilaterally.     Reflexes  ?   R L   Patella   2+ 2+   Achilles    2+ 2+      Clonus of the ankle negative bilaterally         MEDICAL DECISION MAKING    Medical records review: see under HPI section.     DATA    Labs: No new labs available for review since last visit.    Lab Results   Component Value Date/Time    SODIUM 139 08/24/2022 10:08 AM    POTASSIUM 4.0 08/24/2022 10:08 AM    CHLORIDE 102 08/24/2022 10:08 AM    CO2 25 08/24/2022 10:08 AM    ANION 12.0 08/24/2022 10:08 AM    GLUCOSE 88 08/24/2022 10:08 AM    BUN 24 (H) 08/24/2022 10:08 AM    CREATININE 0.58 08/24/2022 10:08 AM    CALCIUM 9.7 08/24/2022 10:08 AM    ASTSGOT 21 06/17/2022 06:39 AM    ALTSGPT 16 06/17/2022 06:39 AM    TBILIRUBIN 0.5 06/17/2022 06:39 AM    ALBUMIN 4.4 06/17/2022 06:39 AM    TOTPROTEIN 7.4 06/17/2022 06:39 AM    GLOBULIN 3.0 06/17/2022 06:39 AM    AGRATIO 1.5 06/17/2022 06:39 AM       No results found for: PROTHROMBTM, INR     Lab Results   Component Value Date/Time    WBC 4.9 08/24/2022 10:08 AM    RBC 5.13 08/24/2022 10:08 AM    HEMOGLOBIN 15.1 08/24/2022 10:08 AM    HEMATOCRIT 44.4 08/24/2022 10:08 AM    MCV 86.5 08/24/2022 10:08 AM    MCH 29.4 08/24/2022 10:08 AM    MCHC 34.0 08/24/2022 10:08 AM    MPV 9.4 08/24/2022 10:08 AM    NEUTSPOLYS 51.50 06/17/2022 06:39 AM    LYMPHOCYTES 38.30 06/17/2022 06:39 AM    MONOCYTES 6.50 06/17/2022 06:39 AM    EOSINOPHILS 2.20 06/17/2022 06:39 AM    BASOPHILS 1.30 06/17/2022 06:39 AM        Lab Results   Component Value Date/Time    HBA1C 5.1 06/17/2022 06:39 AM         EMG by Dr. Murray 22  IMPRESSION:  This is an abnormal study.  There is electrophysiologic evidence consistent with a chronic right L4 radiculopathy.  Clinical correlation recommended.      Imaging:   I personally reviewed following images, these are my reads  MRI lumbar spine 2022  Shallow broad-based disc bulge at L3-4 and L4-5.  At L3-4 there is mild bilateral neuroforaminal stenosis.  At L4-5 there is moderate central canal stenosis and right lateral recess stenosis.  There is bilateral facet arthropathy most notable at L3-4, L4-5, and L5-S1.  There is no significant central canal stenosis or neuroforaminal stenosis elsewhere. See formal radiology report for further details.     IMAGING radiology reads. I reviewed the following radiology reads   MRI lumbar spine 2022       Results for orders placed during the hospital encounter of 22     DX-LUMBAR SPINE-2 OR 3 VIEWS     Impression  No acute osseous abnormality.  Mild retrolisthesis at L5-S1, likely degenerative.  Mild to moderate degenerative change of the lumbar spine, most at L5-S1.    Diagnosis  {No diagnosis found. (Refresh or delete this SmartLink)}          ASSESSMENT AND PLAN:  Romina Serrato (: 1965) is a female who presents with ongoing weakness of her right ankle dorsiflexor and great toe extensors with impaired sensation to light touch in the L4 and L5 distribution, likely consistent with L4 and L5 lumbar radiculopathy.  Symptoms overall stable.  Slight atrophy of her right gastrocnemius muscle compared to the left  EMG 22 showed chronic right L4 radiculopathy.     She has negative provocative exam testing for peroneal neuropathy and lumbar radiculopathy on exam.        There are no diagnoses linked to this encounter.          PLAN  Physical Therapy: Discussed that she should continue physical therapy which should hopefully improve her strength.  Okay to continue with acupuncture, yoga, Pilates     Diagnostic  workup: no new imaging needed at this time    Medications:   -I have discussed that she may continue OTC ibuprofen  -I have discussed possible prescription for gabapentin if pain becomes intolerable.  Patient would like to defer this at this time as her pain is tolerable.       Interventions:   - s/p right L4-5 transforaminal epidural steroid injection with improvement in numbness, no change in strength, onset of slight pain afterwards, similar to her pain that she experienced at the initial onset of her symptoms    Other  -Referral for EMG/NCS for further evaluation given no significant improvement after physical therapy  -Referral to orthotics for right AFO given her recent 2 falls while walking the dog.    Follow-up: In 2 months assess progress with PT.  Discussed that if her EMG results come back sooner, I will reach out to her if she needs to act on the results in the meantime.  Discussed that if her EMG is not yet done by that time, okay to postpone follow-up appointment with me    No orders of the defined types were placed in this encounter.        Eden Ness MD  Interventional Pain and Spine  Physical Medicine and Rehabilitation  [facility]      The above note documents my personal evaluation of this patient. In addition, I have reviewed and confirmed with the patient and MA the supportive information documented in today's Patient Health Questionnaire and Office Note.     Please note that this dictation was created using voice recognition software. I have made every reasonable attempt to correct obvious errors, but I expect that there are errors of grammar and possibly content that I did not discover before finalizing the note.

## 2023-02-28 ENCOUNTER — APPOINTMENT (OUTPATIENT)
Dept: PHYSICAL THERAPY | Facility: REHABILITATION | Age: 58
End: 2023-02-28
Attending: STUDENT IN AN ORGANIZED HEALTH CARE EDUCATION/TRAINING PROGRAM
Payer: COMMERCIAL

## 2023-03-09 ENCOUNTER — OFFICE VISIT (OUTPATIENT)
Dept: PHYSICAL MEDICINE AND REHAB | Facility: MEDICAL CENTER | Age: 58
End: 2023-03-09
Payer: COMMERCIAL

## 2023-03-09 VITALS
TEMPERATURE: 97.5 F | HEIGHT: 65 IN | HEART RATE: 67 BPM | SYSTOLIC BLOOD PRESSURE: 108 MMHG | OXYGEN SATURATION: 97 % | BODY MASS INDEX: 28.32 KG/M2 | WEIGHT: 169.97 LBS | DIASTOLIC BLOOD PRESSURE: 70 MMHG

## 2023-03-09 DIAGNOSIS — M54.16 LUMBAR RADICULOPATHY: ICD-10-CM

## 2023-03-09 DIAGNOSIS — R20.0 NUMBNESS AND TINGLING OF RIGHT LEG: ICD-10-CM

## 2023-03-09 DIAGNOSIS — M21.371 FOOT DROP, RIGHT: ICD-10-CM

## 2023-03-09 DIAGNOSIS — R20.2 NUMBNESS AND TINGLING OF RIGHT LEG: ICD-10-CM

## 2023-03-09 DIAGNOSIS — R29.898 RIGHT LEG WEAKNESS: ICD-10-CM

## 2023-03-09 DIAGNOSIS — M62.561 ATROPHY OF CALF MUSCLES ON RIGHT: ICD-10-CM

## 2023-03-09 LAB
APOB+LDLR+PCSK9 GENE MUT ANL BLD/T: NOT DETECTED
BRCA1+BRCA2 DEL+DUP + FULL MUT ANL BLD/T: NOT DETECTED
MLH1+MSH2+MSH6+PMS2 GN DEL+DUP+FUL M: NOT DETECTED

## 2023-03-09 PROCEDURE — 99213 OFFICE O/P EST LOW 20 MIN: CPT | Performed by: STUDENT IN AN ORGANIZED HEALTH CARE EDUCATION/TRAINING PROGRAM

## 2023-03-09 ASSESSMENT — FIBROSIS 4 INDEX: FIB4 SCORE: 0.73

## 2023-03-09 ASSESSMENT — PATIENT HEALTH QUESTIONNAIRE - PHQ9: CLINICAL INTERPRETATION OF PHQ2 SCORE: 0

## 2023-03-09 ASSESSMENT — PAIN SCALES - GENERAL: PAINLEVEL: 2=MINIMAL-SLIGHT

## 2023-03-09 NOTE — PROGRESS NOTES
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 She is unsure what started this but thinks it could've been from an accident with her Pilates reformer machine in May, a fall onto her right side in May, or a fall onto her right side on  when her dog pulled her down.  After her Pilates reformer machine accident in May, she had severe radiating pain down her posterolateral right glute down her lateral leg to her ankle that lasted approximately 4-6 weeks.       In early July she started noticing numbness radiating from her lateral right knee down to her foot. This is mildly painful. She then noticed slapping of her right foot when walking.  She endorses difficulty wearing heels due to flopping of her right foot and weakness of her right ankle dorsiflexor.  She tripped over her right foot every other day.  Endorses pain that wakes her up from sleep when she rolls onto her right side. Strength and numbness haven't changed in severity since onset.  She denies pain significantly limited by pain.  Pain severity currently 4/10.  Her symptoms do not change with any activity or positioning.     Notes that she has intentionally lost 80 lbs with diet changes since 2022 and now has been crossing her right leg over her left more.  She notices this can exacerbate the numbness and weakness in her leg.      The patient has not tried medications, injections, or physical therapy for this problem.  She denies previous history of this issue.  She does endorse remote history of right ankle fracture when she was 18 but no residual issues with numbness, ankle stiffness,  or weakness.     Medical history includes cataracts, skin cancer    HPI  Today's visit   Romina Serrato ( 1965) is a female with Diagnoses of Right leg weakness, Foot drop, right, Lumbar radiculopathy, Numbness and tingling of right leg, and Atrophy of calf muscles on right were pertinent to this visit.    Presents today for f/u. Notes improvement in left ankle dorsiflexor strength with acupuncture every 1-2 weeks. Ongoing tingling at lateral right ankle. Still doing PT with uncertain improvement. Pain does not significantly bother her. Pain severity 2/10 currently. EMG obtained since last visit showed evidence of chronic right L4 radiculopathy.     Notes she is not wearing the AFO much due to blisters while wearing it. Since her last visit she fell twice and doesn't feel that either of these falls were related to her foot drop. On new years pa she tripped over a raised cord that her  had left out. She has some residual pain near her right fibular head after the fall. Later she fell backwards after slipping on the ice which she thinks was unrelated to her foot drop.     Procedure history:  - 10/4/22 Lumbar Transforaminal Epidural Steroid Injection at the  right  L4-5 levels -improvement in numbness, no change in strength, onset of slight pain afterwards, similar to her pain that she experienced at the initial onset of her symptoms      ROS:   Red Flags ROS:   Fever, Chills, Sweats: Denies  Involuntary Weight Loss: Denies  Bladder Incontinence: Denies  Bowel Incontinence: denies  Saddle Anesthesia: Denies    All other systems reviewed and negative.     PMHx:   Past Medical History:   Diagnosis Date    Cancer (HCC)        PSHx:   Past Surgical History:   Procedure Laterality Date    NE NJX AA&/STRD TFRML EPI LUMBAR/SACRAL 1 LEVEL Right 10/4/2022    Procedure: RIGHT L4-5 transforaminal epidural steroid injection with fluoroscopic guidance;  Surgeon: Eden Ness M.D.;  Location: [facility];  Service: Pain Management    LAP, REMOVE ADJUST BOSSMAN RESTRICT D* N/A 9/1/2022    Procedure: LAPAROSCOPIC GASTRIC BAND AND PORT REMOVAL;  Surgeon: Mook Alfred M.D.;  Location: SURGERY [facility];  Service: General    GASTRIC BANDING LAPAROSCOPIC  2012    OTHER ORTHOPEDIC SURGERY  1979    right wrist surgery       Family Hx:   Family History   Problem Relation Age of Onset    Stroke Mother         TIA, carotid endarterectomy    Hypertension Mother     Diabetes Mother     Cancer Father         basal and squamous cell skin    Hyperlipidemia Father     Hypertension Father        Social Hx:  Social History     Socioeconomic History    Marital status:      Spouse name: Not on file    Number of children: Not on file    Years of education: Not on file    Highest education level: Not on file   Occupational History    Not on file   Tobacco Use    Smoking status: Never    Smokeless tobacco: Never   Vaping Use    Vaping Use: Never used   Substance and Sexual Activity    Alcohol use: Not Currently     Comment: rare    Drug use: Never    Sexual activity: Not on file   Other Topics Concern    Not on file   Social History Narrative    Not on file     Social Determinants of Health     Financial Resource Strain: Not on file   Food Insecurity: Not on file   Transportation Needs: Not on file   Physical Activity: Not on file   Stress: Not on file   Social Connections: Not on file   Intimate Partner Violence: Not on file   Housing Stability: Not on file       Allergies:  Allergies   Allergen Reactions    Caffeine Unspecified and Vomiting     migraines  migraines      Clindamycin Anaphylaxis     Throat irritation    Codeine      nausea    Erythromycin      diarrhea    Fentanyl      Other reaction(s): GI Upset  In combo with versed  In combo with versed      Oxycodone      vomitting      Penicillins Anaphylaxis    Sulfa Drugs Anaphylaxis    Versed      vomiting       Medications: reviewed on epic.   Outpatient  "Medications Marked as Taking for the 3/9/23 encounter (Office Visit) with Eden Ness M.D.   Medication Sig Dispense Refill    loratadine (CLARITIN) 10 MG Tab Take 10 mg by mouth every day.      Fexofenadine HCl (MUCINEX ALLERGY PO) Take  by mouth.      Multiple Vitamin (ONE-A-DAY ESSENTIAL PO) Take  by mouth.      Ascorbic Acid (VITAMIN C PO) Take  by mouth.      minoxidil (LONITEN) 2.5 MG Tab Take 1.25 mg by mouth every day.      cyclosporin (RESTASIS) 0.05 % ophthalmic emulsion Administer 1 Drop into both eyes 2 times a day.          Current Outpatient Medications on File Prior to Visit   Medication Sig Dispense Refill    loratadine (CLARITIN) 10 MG Tab Take 10 mg by mouth every day.      Fexofenadine HCl (MUCINEX ALLERGY PO) Take  by mouth.      Multiple Vitamin (ONE-A-DAY ESSENTIAL PO) Take  by mouth.      Ascorbic Acid (VITAMIN C PO) Take  by mouth.      minoxidil (LONITEN) 2.5 MG Tab Take 1.25 mg by mouth every day.      cyclosporin (RESTASIS) 0.05 % ophthalmic emulsion Administer 1 Drop into both eyes 2 times a day.       No current facility-administered medications on file prior to visit.         EXAMINATION     Physical Exam:   /70 (BP Location: Right arm, Patient Position: Sitting, BP Cuff Size: Adult)   Pulse 67   Temp 36.4 °C (97.5 °F) (Temporal)   Ht 1.651 m (5' 5\")   Wt 77.1 kg (169 lb 15.6 oz)   SpO2 97%     Constitutional:   Body Habitus: Body mass index is 28.29 kg/m².  Cooperation: Fully cooperates with exam  Appearance: Well-groomed, well-nourished.    Eyes: No scleral icterus to suggest severe liver disease, no proptosis to suggest severe hyperthyroidism    ENT -no obvious auditory deficits, no noticeable facial droop     Skin -no rashes or lesions noted     Respiratory-  breathing comfortably on room air, no audible wheezing    Cardiovascular-distal extremities warm and well perfused.  No lower extremity edema is noted.     Gastrointestinal - no obvious abdominal masses, " non-distended    Psychiatric- alert and oriented ×3. Normal affect.     Gait -steady gait.  Feet clear the ground without difficulty.  significant improvement in ability to heel walk on right, nearly symmetric with the left.  No difficulty with toe walking bilaterally.     Musculoskeletal and Neuro -      Thoracic/Lumbar Spine/Sacral Spine/Hips   Inspection: Slight atrophy of her right gastrocnemius muscle compared to the left       Lumbar spine /hip provocative exam maneuvers  Straight leg raise negative bilaterally  FADIR test negative bilaterally     SI joint tests  ANIKA test negative bilaterally     Key points for the international standards for neurological classification of spinal cord injury (ISNCSCI) to light touch.   Dermatome R L   L2 2 2   L3 2 2   L4 1 in calf 2   L5 1 in calf 2   S1 2 2   S2 2 2         Motor Exam Lower Extremities  ? Myotome R L   Hip flexion L2 5 5   Knee extension L3 5 5   Ankle dorsiflexion L4 4+ 5   Toe extension L5 4+ 5   Ankle plantarflexion S1 5 5      No tenderness to palpation at knee medial or lateral joint lines or infrapatellar facets. Full AROM of bilateral knees, neg varus and valgus stress tests and lachmans on right    Previous exam  Negative Tinel's at right fibular head and posterior right knee.  Negative Tinel's at left fibular head.    There is full active range of motion with lumbar extension     Facet loading maneuver negative bilaterally     Palpation:   No tenderness to palpation over the midline of lumbosacral spine, paraspinal muscles bilaterally, lumbar facets bilaterally, sacroiliac joints bilaterally, PSIS bilaterally, and greater trochanters bilaterally.     Reflexes  ?   R L   Patella   2+ 2+   Achilles    2+ 2+      Clonus of the ankle negative bilaterally         MEDICAL DECISION MAKING    Medical records review: see under HPI section.     DATA    Labs: No new labs available for review since last visit.    Lab Results   Component Value Date/Time     SODIUM 139 08/24/2022 10:08 AM    POTASSIUM 4.0 08/24/2022 10:08 AM    CHLORIDE 102 08/24/2022 10:08 AM    CO2 25 08/24/2022 10:08 AM    ANION 12.0 08/24/2022 10:08 AM    GLUCOSE 88 08/24/2022 10:08 AM    BUN 24 (H) 08/24/2022 10:08 AM    CREATININE 0.58 08/24/2022 10:08 AM    CALCIUM 9.7 08/24/2022 10:08 AM    ASTSGOT 21 06/17/2022 06:39 AM    ALTSGPT 16 06/17/2022 06:39 AM    TBILIRUBIN 0.5 06/17/2022 06:39 AM    ALBUMIN 4.4 06/17/2022 06:39 AM    TOTPROTEIN 7.4 06/17/2022 06:39 AM    GLOBULIN 3.0 06/17/2022 06:39 AM    AGRATIO 1.5 06/17/2022 06:39 AM       No results found for: PROTHROMBTM, INR     Lab Results   Component Value Date/Time    WBC 4.9 08/24/2022 10:08 AM    RBC 5.13 08/24/2022 10:08 AM    HEMOGLOBIN 15.1 08/24/2022 10:08 AM    HEMATOCRIT 44.4 08/24/2022 10:08 AM    MCV 86.5 08/24/2022 10:08 AM    MCH 29.4 08/24/2022 10:08 AM    MCHC 34.0 08/24/2022 10:08 AM    MPV 9.4 08/24/2022 10:08 AM    NEUTSPOLYS 51.50 06/17/2022 06:39 AM    LYMPHOCYTES 38.30 06/17/2022 06:39 AM    MONOCYTES 6.50 06/17/2022 06:39 AM    EOSINOPHILS 2.20 06/17/2022 06:39 AM    BASOPHILS 1.30 06/17/2022 06:39 AM        Lab Results   Component Value Date/Time    HBA1C 5.1 06/17/2022 06:39 AM        EMG by Dr. Murray 12/28/22  IMPRESSION:  This is an abnormal study.  There is electrophysiologic evidence consistent with a chronic right L4 radiculopathy.  Clinical correlation recommended.      Imaging:   I personally reviewed following images, these are my reads  MRI lumbar spine 9/16/2022  Shallow broad-based disc bulge at L3-4 and L4-5.  At L3-4 there is mild bilateral neuroforaminal stenosis.  At L4-5 there is moderate central canal stenosis and right lateral recess stenosis.  There is bilateral facet arthropathy most notable at L3-4, L4-5, and L5-S1.  There is no significant central canal stenosis or neuroforaminal stenosis elsewhere. See formal radiology report for further details.     IMAGING radiology reads. I reviewed the  following radiology reads   MRI lumbar spine 2022       Results for orders placed during the hospital encounter of 22     DX-LUMBAR SPINE-2 OR 3 VIEWS     Impression  No acute osseous abnormality.  Mild retrolisthesis at L5-S1, likely degenerative.  Mild to moderate degenerative change of the lumbar spine, most at L5-S1.    Diagnosis  Visit Diagnoses     ICD-10-CM   1. Right leg weakness  R29.898   2. Foot drop, right  M21.371   3. Lumbar radiculopathy  M54.16   4. Numbness and tingling of right leg  R20.0    R20.2   5. Atrophy of calf muscles on right  M62.561             ASSESSMENT AND PLAN:  Romina Serrato (: 1965) is a female who presents with improving weakness of her right ankle dorsiflexor and great toe extensors with impaired sensation to light touch in the L4 and possibly L5 distribution, likely consistent with L4 and possibly L5 lumbar radiculopathy.  Symptoms overall stable.  Slight atrophy of her right gastrocnemius muscle compared to the left  EMG 22 showed chronic right L4 radiculopathy.     She has negative provocative exam testing for peroneal neuropathy and lumbar radiculopathy on exam.        Romina was seen today for follow-up.    Diagnoses and all orders for this visit:    Right leg weakness    Foot drop, right    Lumbar radiculopathy    Numbness and tingling of right leg    Atrophy of calf muscles on right        PLAN  Physical Therapy: Discussed that she could continue physical therapy home exercise program which should hopefully eventually improve her strength.  Advised her to work with her physical therapist to see if more formal physical therapy is needed.  Okay to continue with acupuncture, yoga, Pilates     Diagnostic workup: no new imaging needed at this time. Personally reviewed at today's visit:  EMG 2022 results    Medications:   -I have discussed that she may continue OTC ibuprofen  -I have discussed possible prescription for gabapentin if pain  becomes intolerable.  Patient would like to defer this at this time as her pain is tolerable.       Interventions:   - s/p right L4-5 transforaminal epidural steroid injection with improvement in numbness, no change in strength, onset of slight pain afterwards, similar to her pain that she experienced at the initial onset of her symptoms    Other  -Advised her to follow-up with orthotics for possible adjustment to right AFO given limitation from blisters    Follow-up: As needed    No orders of the defined types were placed in this encounter.        Eden Ness MD  Interventional Pain and Spine  Physical Medicine and Rehabilitation  [facility]      The above note documents my personal evaluation of this patient. In addition, I have reviewed and confirmed with the patient and MA the supportive information documented in today's Patient Health Questionnaire and Office Note.     Please note that this dictation was created using voice recognition software. I have made every reasonable attempt to correct obvious errors, but I expect that there are errors of grammar and possibly content that I did not discover before finalizing the note.

## 2023-03-14 NOTE — OP THERAPY DISCHARGE SUMMARY
Outpatient Physical Therapy  DISCHARGE SUMMARY NOTE      [facility] [place]  [address], [address]  [place] [address]  Phone:  [phone]    Date of Visit: 01/09/2023    Patient: Romina Serrato  YOB: 1965  MRN: [id number]     Referring Provider: Eden Ness M.D.  [address]  [address]  [place] [address]   Referring Diagnosis Radiculopathy, lumbar region [M54.16];Anesthesia of skin [R20.0];Paresthesia of skin [R20.2];Other symptoms and signs involving the musculoskeletal system [R29.898]         Functional Assessment Used        Your patient is being discharged from Physical Therapy with the following comments:   Progress plateau  Patient has failed to schedule or reschedule follow-up visits    Comments:  Patient has reached a plateau in physical therapy at this point and has HEP to continue working on improvement in strength  to (R) LE     Limitations Remaining:  Atrophy to the (R) calf muscles; adjustment to the (R) AFO since atrophy of calf muscles     Recommendations:  Follow up wit her PCP for current POC in the future.    Luis Alberto Velasco, PT    Date: 3/14/2023

## 2023-04-20 ENCOUNTER — APPOINTMENT (OUTPATIENT)
Dept: PHYSICAL THERAPY | Facility: REHABILITATION | Age: 58
End: 2023-04-20
Attending: STUDENT IN AN ORGANIZED HEALTH CARE EDUCATION/TRAINING PROGRAM
Payer: COMMERCIAL

## 2023-05-11 ENCOUNTER — APPOINTMENT (RX ONLY)
Dept: URBAN - METROPOLITAN AREA CLINIC 6 | Facility: CLINIC | Age: 58
Setting detail: DERMATOLOGY
End: 2023-05-11

## 2023-05-11 DIAGNOSIS — Z85.828 PERSONAL HISTORY OF OTHER MALIGNANT NEOPLASM OF SKIN: ICD-10-CM

## 2023-05-11 DIAGNOSIS — D22 MELANOCYTIC NEVI: ICD-10-CM

## 2023-05-11 DIAGNOSIS — D18.0 HEMANGIOMA: ICD-10-CM

## 2023-05-11 DIAGNOSIS — L81.4 OTHER MELANIN HYPERPIGMENTATION: ICD-10-CM

## 2023-05-11 DIAGNOSIS — L82.1 OTHER SEBORRHEIC KERATOSIS: ICD-10-CM

## 2023-05-11 DIAGNOSIS — Z71.89 OTHER SPECIFIED COUNSELING: ICD-10-CM

## 2023-05-11 DIAGNOSIS — L72.0 EPIDERMAL CYST: ICD-10-CM

## 2023-05-11 DIAGNOSIS — L57.0 ACTINIC KERATOSIS: ICD-10-CM

## 2023-05-11 PROBLEM — D22.5 MELANOCYTIC NEVI OF TRUNK: Status: ACTIVE | Noted: 2023-05-11

## 2023-05-11 PROBLEM — D48.5 NEOPLASM OF UNCERTAIN BEHAVIOR OF SKIN: Status: ACTIVE | Noted: 2023-05-11

## 2023-05-11 PROBLEM — D23.5 OTHER BENIGN NEOPLASM OF SKIN OF TRUNK: Status: ACTIVE | Noted: 2023-05-11

## 2023-05-11 PROBLEM — D18.01 HEMANGIOMA OF SKIN AND SUBCUTANEOUS TISSUE: Status: ACTIVE | Noted: 2023-05-11

## 2023-05-11 PROCEDURE — ? BIOPSY BY SHAVE METHOD

## 2023-05-11 PROCEDURE — 17000 DESTRUCT PREMALG LESION: CPT | Mod: 59

## 2023-05-11 PROCEDURE — ? COUNSELING

## 2023-05-11 PROCEDURE — 11103 TANGNTL BX SKIN EA SEP/ADDL: CPT

## 2023-05-11 PROCEDURE — ? SUNSCREEN TREATMENT REGIMEN

## 2023-05-11 PROCEDURE — 11102 TANGNTL BX SKIN SINGLE LES: CPT

## 2023-05-11 PROCEDURE — ? LIQUID NITROGEN

## 2023-05-11 PROCEDURE — 99213 OFFICE O/P EST LOW 20 MIN: CPT | Mod: 25

## 2023-05-11 PROCEDURE — ? SUNSCREEN RECOMMENDATIONS

## 2023-05-11 ASSESSMENT — LOCATION SIMPLE DESCRIPTION DERM
LOCATION SIMPLE: RIGHT HAND
LOCATION SIMPLE: SCALP
LOCATION SIMPLE: RIGHT EYELID
LOCATION SIMPLE: CHEST
LOCATION SIMPLE: ABDOMEN
LOCATION SIMPLE: RIGHT UPPER BACK
LOCATION SIMPLE: LEFT HAND
LOCATION SIMPLE: LEFT FOREARM
LOCATION SIMPLE: LEFT CHEEK
LOCATION SIMPLE: LEFT UPPER BACK

## 2023-05-11 ASSESSMENT — LOCATION DETAILED DESCRIPTION DERM
LOCATION DETAILED: RIGHT RADIAL DORSAL HAND
LOCATION DETAILED: EPIGASTRIC SKIN
LOCATION DETAILED: SUBXIPHOID
LOCATION DETAILED: RIGHT MEDIAL CANTHUS
LOCATION DETAILED: LEFT INFERIOR LATERAL UPPER BACK
LOCATION DETAILED: LEFT ULNAR DORSAL HAND
LOCATION DETAILED: RIGHT SUPERIOR PARIETAL SCALP
LOCATION DETAILED: RIGHT MID-UPPER BACK
LOCATION DETAILED: LEFT CENTRAL BUCCAL CHEEK
LOCATION DETAILED: MIDDLE STERNUM
LOCATION DETAILED: LEFT PROXIMAL DORSAL FOREARM

## 2023-05-11 ASSESSMENT — LOCATION ZONE DERM
LOCATION ZONE: FACE
LOCATION ZONE: HAND
LOCATION ZONE: EYELID
LOCATION ZONE: ARM
LOCATION ZONE: SCALP
LOCATION ZONE: TRUNK

## 2023-06-12 RX ORDER — MINOXIDIL 2.5 MG/1
1 TABLET ORAL QD
Qty: 30 | Refills: 3 | Status: ERX

## 2023-07-03 ENCOUNTER — APPOINTMENT (RX ONLY)
Dept: URBAN - METROPOLITAN AREA CLINIC 6 | Facility: CLINIC | Age: 58
Setting detail: DERMATOLOGY
End: 2023-07-03

## 2023-07-03 DIAGNOSIS — L65.8 OTHER SPECIFIED NONSCARRING HAIR LOSS: ICD-10-CM | Status: INADEQUATELY CONTROLLED

## 2023-07-03 DIAGNOSIS — Z71.89 OTHER SPECIFIED COUNSELING: ICD-10-CM

## 2023-07-03 DIAGNOSIS — L90.5 SCAR CONDITIONS AND FIBROSIS OF SKIN: ICD-10-CM

## 2023-07-03 PROCEDURE — 99214 OFFICE O/P EST MOD 30 MIN: CPT

## 2023-07-03 PROCEDURE — ? PRESCRIPTION MEDICATION MANAGEMENT

## 2023-07-03 PROCEDURE — ? COUNSELING

## 2023-07-03 PROCEDURE — ? ORDER TESTS

## 2023-07-03 PROCEDURE — ? SUNSCREEN TREATMENT REGIMEN

## 2023-07-03 PROCEDURE — ? ADDITIONAL NOTES

## 2023-07-03 PROCEDURE — ? PRESCRIPTION

## 2023-07-03 RX ORDER — SPIRONOLACTONE 100 MG/1
1 TABLET, FILM COATED ORAL QD
Qty: 30 | Refills: 6 | Status: ERX | COMMUNITY
Start: 2023-07-03

## 2023-07-03 RX ADMIN — SPIRONOLACTONE 1: 100 TABLET, FILM COATED ORAL at 00:00

## 2023-07-03 ASSESSMENT — LOCATION DETAILED DESCRIPTION DERM
LOCATION DETAILED: LEFT ULNAR DORSAL HAND
LOCATION DETAILED: RIGHT INFERIOR UPPER BACK
LOCATION DETAILED: LEFT INFERIOR FOREHEAD
LOCATION DETAILED: RIGHT RADIAL DORSAL HAND
LOCATION DETAILED: MID-FRONTAL SCALP

## 2023-07-03 ASSESSMENT — LOCATION SIMPLE DESCRIPTION DERM
LOCATION SIMPLE: LEFT HAND
LOCATION SIMPLE: RIGHT UPPER BACK
LOCATION SIMPLE: RIGHT HAND
LOCATION SIMPLE: LEFT FOREHEAD
LOCATION SIMPLE: ANTERIOR SCALP

## 2023-07-03 ASSESSMENT — LOCATION ZONE DERM
LOCATION ZONE: HAND
LOCATION ZONE: SCALP
LOCATION ZONE: FACE
LOCATION ZONE: TRUNK

## 2023-07-03 NOTE — PROCEDURE: ADDITIONAL NOTES
Detail Level: Simple
Render Risk Assessment In Note?: no
Additional Notes: focal mucinosis on bx - resolved, no other similar lesions appreciated

## 2023-07-03 NOTE — PROCEDURE: MIPS QUALITY
Quality 226: Preventive Care And Screening: Tobacco Use: Screening And Cessation Intervention: Patient screened for tobacco use and is an ex/non-smoker
Quality 130: Documentation Of Current Medications In The Medical Record: Current Medications Documented
Detail Level: Detailed
I have reviewed and confirmed nurses' notes for patient's medications, allergies, medical history, and surgical history.

## 2023-07-03 NOTE — PROCEDURE: ORDER TESTS
Billing Type: Third-Party Bill
Bill For Surgical Tray: no
Expected Date Of Service: 07/03/2023
Performing Laboratory: 0

## 2023-07-03 NOTE — PROCEDURE: PRESCRIPTION MEDICATION MANAGEMENT
Detail Level: Zone
Initiate Treatment: Spironolactone 100mg daily
Render In Strict Bullet Format?: No
Continue Regimen: Minoxidil
Plan: Discussed PRP

## 2023-12-11 ENCOUNTER — PATIENT MESSAGE (OUTPATIENT)
Dept: MEDICAL GROUP | Facility: LAB | Age: 58
End: 2023-12-11
Payer: COMMERCIAL

## 2023-12-11 RX ORDER — DOXYCYCLINE HYCLATE 100 MG
100 TABLET ORAL 2 TIMES DAILY
Qty: 14 TABLET | Refills: 0 | Status: SHIPPED | OUTPATIENT
Start: 2023-12-11 | End: 2023-12-18

## 2024-05-14 ENCOUNTER — APPOINTMENT (RX ONLY)
Dept: URBAN - METROPOLITAN AREA CLINIC 6 | Facility: CLINIC | Age: 59
Setting detail: DERMATOLOGY
End: 2024-05-14

## 2024-05-14 DIAGNOSIS — Z85.828 PERSONAL HISTORY OF OTHER MALIGNANT NEOPLASM OF SKIN: ICD-10-CM

## 2024-05-14 DIAGNOSIS — D18.0 HEMANGIOMA: ICD-10-CM

## 2024-05-14 DIAGNOSIS — D22 MELANOCYTIC NEVI: ICD-10-CM

## 2024-05-14 DIAGNOSIS — Z71.89 OTHER SPECIFIED COUNSELING: ICD-10-CM

## 2024-05-14 DIAGNOSIS — L81.4 OTHER MELANIN HYPERPIGMENTATION: ICD-10-CM

## 2024-05-14 DIAGNOSIS — L65.8 OTHER SPECIFIED NONSCARRING HAIR LOSS: ICD-10-CM

## 2024-05-14 DIAGNOSIS — D17 BENIGN LIPOMATOUS NEOPLASM: ICD-10-CM

## 2024-05-14 DIAGNOSIS — L82.0 INFLAMED SEBORRHEIC KERATOSIS: ICD-10-CM

## 2024-05-14 PROBLEM — D18.01 HEMANGIOMA OF SKIN AND SUBCUTANEOUS TISSUE: Status: ACTIVE | Noted: 2024-05-14

## 2024-05-14 PROBLEM — D23.5 OTHER BENIGN NEOPLASM OF SKIN OF TRUNK: Status: ACTIVE | Noted: 2024-05-14

## 2024-05-14 PROBLEM — D22.5 MELANOCYTIC NEVI OF TRUNK: Status: ACTIVE | Noted: 2024-05-14

## 2024-05-14 PROBLEM — D17.23 BENIGN LIPOMATOUS NEOPLASM OF SKIN AND SUBCUTANEOUS TISSUE OF RIGHT LEG: Status: ACTIVE | Noted: 2024-05-14

## 2024-05-14 PROCEDURE — ? PRESCRIPTION MEDICATION MANAGEMENT

## 2024-05-14 PROCEDURE — ? SUNSCREEN RECOMMENDATIONS

## 2024-05-14 PROCEDURE — 17110 DESTRUCTION B9 LES UP TO 14: CPT

## 2024-05-14 PROCEDURE — ? PRESCRIPTION

## 2024-05-14 PROCEDURE — ? SUNSCREEN TREATMENT REGIMEN

## 2024-05-14 PROCEDURE — ? DEFER

## 2024-05-14 PROCEDURE — ? LIQUID NITROGEN

## 2024-05-14 PROCEDURE — ? ORDER TESTS

## 2024-05-14 PROCEDURE — ? COUNSELING

## 2024-05-14 PROCEDURE — 99214 OFFICE O/P EST MOD 30 MIN: CPT | Mod: 25

## 2024-05-14 RX ORDER — SPIRONOLACTONE 100 MG/1
1 TABLET, FILM COATED ORAL QD
Qty: 45 | Refills: 11 | Status: ERX

## 2024-05-14 RX ORDER — MINOXIDIL 2.5 MG/1
TABLET ORAL QD
Qty: 30 | Refills: 6 | Status: ERX

## 2024-05-14 ASSESSMENT — LOCATION ZONE DERM
LOCATION ZONE: TRUNK
LOCATION ZONE: HAND
LOCATION ZONE: SCALP
LOCATION ZONE: LEG
LOCATION ZONE: ARM

## 2024-05-14 ASSESSMENT — LOCATION SIMPLE DESCRIPTION DERM
LOCATION SIMPLE: LEFT HAND
LOCATION SIMPLE: ANTERIOR SCALP
LOCATION SIMPLE: LEFT FOREARM
LOCATION SIMPLE: RIGHT POSTERIOR THIGH
LOCATION SIMPLE: SCALP
LOCATION SIMPLE: RIGHT HAND
LOCATION SIMPLE: ABDOMEN
LOCATION SIMPLE: CHEST
LOCATION SIMPLE: RIGHT THIGH

## 2024-05-14 ASSESSMENT — LOCATION DETAILED DESCRIPTION DERM
LOCATION DETAILED: MID-FRONTAL SCALP
LOCATION DETAILED: RIGHT ANTERIOR PROXIMAL THIGH
LOCATION DETAILED: LEFT PROXIMAL DORSAL FOREARM
LOCATION DETAILED: LEFT ULNAR DORSAL HAND
LOCATION DETAILED: RIGHT SUPERIOR PARIETAL SCALP
LOCATION DETAILED: RIGHT PROXIMAL POSTERIOR THIGH
LOCATION DETAILED: MIDDLE STERNUM
LOCATION DETAILED: EPIGASTRIC SKIN
LOCATION DETAILED: RIGHT RADIAL DORSAL HAND

## 2024-05-14 NOTE — PROCEDURE: PRESCRIPTION MEDICATION MANAGEMENT
Detail Level: Zone
Render In Strict Bullet Format?: No
Continue Regimen: Minoxidil 1.25 mg PO
Plan: Labs to be done one month after increasing Spironolactone to 150 mg daily.
Modify Regimen: Increase Spironolactone to 150 mg daily

## 2024-05-14 NOTE — PROCEDURE: DEFER
Size Of Lesion In Cm (Optional): 0
Introduction Text (Please End With A Colon): Declines treatment at this time
Detail Level: Detailed

## 2024-05-14 NOTE — PROCEDURE: LIQUID NITROGEN
Medical Necessity Clause: This procedure was medically necessary because the lesions that were treated were:
Consent: The patient's verbal consent was obtained including but not limited to risks of crusting, scabbing, blistering, scarring, darker or lighter pigmentary change, recurrence, incomplete removal and infection.
Show Topical Anesthesia Variable?: Yes
Detail Level: Detailed
Medical Necessity Information: It is in your best interest to select a reason for this procedure from the list below. All of these items fulfill various CMS LCD requirements except the new and changing color options.
Post-Care Instructions: I reviewed with the patient in detail post-care instructions. Patient is to wear sunprotection, and avoid picking at any of the treated lesions. Pt may apply Vaseline to crusted or scabbing areas.
Number Of Freeze-Thaw Cycles: 3 freeze-thaw cycles
Render Post-Care Instructions In Note?: no
Duration Of Freeze Thaw-Cycle (Seconds): 10-15
Spray Paint Text: The liquid nitrogen was applied to the skin utilizing a spray paint frosting technique.

## 2024-09-12 ENCOUNTER — HOSPITAL ENCOUNTER (OUTPATIENT)
Dept: LAB | Facility: MEDICAL CENTER | Age: 59
End: 2024-09-12
Attending: DERMATOLOGY
Payer: COMMERCIAL

## 2024-09-12 LAB — POTASSIUM SERPL-SCNC: 4.6 MMOL/L (ref 3.6–5.5)

## 2024-09-12 PROCEDURE — 84132 ASSAY OF SERUM POTASSIUM: CPT

## 2024-09-12 PROCEDURE — 36415 COLL VENOUS BLD VENIPUNCTURE: CPT

## 2024-11-19 ENCOUNTER — HOSPITAL ENCOUNTER (OUTPATIENT)
Dept: LAB | Facility: MEDICAL CENTER | Age: 59
End: 2024-11-19
Attending: SPECIALIST
Payer: COMMERCIAL

## 2024-11-19 LAB
EST. AVERAGE GLUCOSE BLD GHB EST-MCNC: 103 MG/DL
HBA1C MFR BLD: 5.2 % (ref 4–5.6)

## 2024-11-19 PROCEDURE — 83036 HEMOGLOBIN GLYCOSYLATED A1C: CPT

## 2024-11-19 PROCEDURE — 36415 COLL VENOUS BLD VENIPUNCTURE: CPT

## 2025-01-23 ENCOUNTER — APPOINTMENT (OUTPATIENT)
Dept: RADIOLOGY | Facility: MEDICAL CENTER | Age: 60
End: 2025-01-23
Attending: PHYSICIAN ASSISTANT
Payer: COMMERCIAL

## 2025-01-27 ENCOUNTER — HOSPITAL ENCOUNTER (OUTPATIENT)
Dept: RADIOLOGY | Facility: MEDICAL CENTER | Age: 60
End: 2025-01-27
Attending: PHYSICIAN ASSISTANT
Payer: COMMERCIAL

## 2025-01-27 DIAGNOSIS — N93.9 ABNORMAL UTERINE AND VAGINAL BLEEDING, UNSPECIFIED: ICD-10-CM

## 2025-01-27 PROCEDURE — 76830 TRANSVAGINAL US NON-OB: CPT

## 2025-02-04 ENCOUNTER — OFFICE VISIT (OUTPATIENT)
Dept: MEDICAL GROUP | Facility: LAB | Age: 60
End: 2025-02-04
Payer: COMMERCIAL

## 2025-02-04 ENCOUNTER — HOSPITAL ENCOUNTER (OUTPATIENT)
Dept: LAB | Facility: MEDICAL CENTER | Age: 60
End: 2025-02-04
Attending: FAMILY MEDICINE
Payer: COMMERCIAL

## 2025-02-04 VITALS
RESPIRATION RATE: 16 BRPM | TEMPERATURE: 97.1 F | DIASTOLIC BLOOD PRESSURE: 60 MMHG | HEART RATE: 70 BPM | SYSTOLIC BLOOD PRESSURE: 112 MMHG | WEIGHT: 181.4 LBS | HEIGHT: 66 IN | BODY MASS INDEX: 29.15 KG/M2 | OXYGEN SATURATION: 97 %

## 2025-02-04 DIAGNOSIS — J45.990 EXERCISE-INDUCED ASTHMA: ICD-10-CM

## 2025-02-04 DIAGNOSIS — N95.0 POSTMENOPAUSAL BLEEDING: ICD-10-CM

## 2025-02-04 DIAGNOSIS — Z79.890 HORMONE REPLACEMENT THERAPY (HRT): ICD-10-CM

## 2025-02-04 DIAGNOSIS — Z12.31 ENCOUNTER FOR SCREENING MAMMOGRAM FOR MALIGNANT NEOPLASM OF BREAST: ICD-10-CM

## 2025-02-04 DIAGNOSIS — M81.0 POSTMENOPAUSAL BONE LOSS: ICD-10-CM

## 2025-02-04 DIAGNOSIS — Z82.49 FAMILY HISTORY OF CHRONIC ISCHEMIC HEART DISEASE: ICD-10-CM

## 2025-02-04 PROCEDURE — 3074F SYST BP LT 130 MM HG: CPT | Performed by: FAMILY MEDICINE

## 2025-02-04 PROCEDURE — 99214 OFFICE O/P EST MOD 30 MIN: CPT | Performed by: FAMILY MEDICINE

## 2025-02-04 PROCEDURE — 84403 ASSAY OF TOTAL TESTOSTERONE: CPT

## 2025-02-04 PROCEDURE — 36415 COLL VENOUS BLD VENIPUNCTURE: CPT

## 2025-02-04 PROCEDURE — 84144 ASSAY OF PROGESTERONE: CPT

## 2025-02-04 PROCEDURE — 84270 ASSAY OF SEX HORMONE GLOBUL: CPT

## 2025-02-04 PROCEDURE — 82671 ASSAY OF ESTROGENS: CPT

## 2025-02-04 PROCEDURE — 84402 ASSAY OF FREE TESTOSTERONE: CPT

## 2025-02-04 PROCEDURE — 3078F DIAST BP <80 MM HG: CPT | Performed by: FAMILY MEDICINE

## 2025-02-04 RX ORDER — ALBUTEROL SULFATE 90 UG/1
2 INHALANT RESPIRATORY (INHALATION) EVERY 4 HOURS PRN
Qty: 1 EACH | Refills: 3 | Status: SHIPPED | OUTPATIENT
Start: 2025-02-04

## 2025-02-04 RX ORDER — PROGESTERONE 100 MG/1
CAPSULE ORAL
COMMUNITY
Start: 2024-10-31

## 2025-02-04 RX ORDER — SPIRONOLACTONE 100 MG/1
TABLET, FILM COATED ORAL
COMMUNITY
Start: 2023-01-02

## 2025-02-04 NOTE — PROGRESS NOTES
Subjective:     Chief Complaint   Patient presents with    Results     US    Shortness of Breath     After exercising          HPI:   Romina presents today for US result discussion. US ordered by urology due to uterine bleeding after last BioT pellet injections.   US showed endometrial complex 7.6 mm, and fibroid present.   This is thicker than prior US in 2021 which showed endometrium 4 mm.   She does report [facility] has been adjusting her progesterone as well, was on 200 mg but was still having bleeding, so they lowered the pellet dose of the estrogen and testosterone and lowered the progesterone as well.     Started bioT for intercourse pain. Pellets just placed 1/9/25.      Both parents with high cholesterol and HTN, mom with Type 2 diabetes. Mom with CHF and kidney failure, ada passed with esophageal and stomach cancer.   Prior stress test in 2019 normal.  Some reactive airway type symptoms with coughing after hard workouts.  There is asthma in the family.    Postmenopausal for at least 4+  years. Had IUD present prior to 2021.       Current Outpatient Medications Ordered in Epic   Medication Sig Dispense Refill    progesterone (PROMETRIUM) 100 MG Cap       spironolactone (ALDACTONE) 100 MG Tab       Multiple Vitamin (ONE-A-DAY ESSENTIAL PO) Take  by mouth.      Ascorbic Acid (VITAMIN C PO) Take  by mouth.      minoxidil (LONITEN) 2.5 MG Tab Take 1.25 mg by mouth every day.      cyclosporin (RESTASIS) 0.05 % ophthalmic emulsion Administer 1 Drop into both eyes 2 times a day.      loratadine (CLARITIN) 10 MG Tab Take 10 mg by mouth every day.      Fexofenadine HCl (MUCINEX ALLERGY PO) Take  by mouth.       No current Epic-ordered facility-administered medications on file.         ROS:  Gen: no fevers/chills, no changes in weight  Eyes: no changes in vision  ENT: no sore throat, no hearing loss, no bloody nose  Pulm: no sob, no cough  CV: no chest pain, no palpitations  GI: no nausea/vomiting, no  "diarrhea  : no dysuria  MSk: no myalgias  Skin: no rash  Neuro: no headaches, no numbness/tingling  Heme/Lymph: no easy bruising      Objective:     Exam:  /60   Pulse 70   Temp 36.2 °C (97.1 °F)   Resp 16   Ht 1.676 m (5' 6\")   Wt 82.3 kg (181 lb 6.4 oz)   LMP  (LMP Unknown)   SpO2 97%   BMI 29.28 kg/m²  Body mass index is 29.28 kg/m².    Gen: AAOx3, NAD, well appearing  HEENT: NCAT, EOMI, Nares patent, Mucosa moist  Resp: Normal chest wall rise and fall, not SOB, no tachypnea  Skin: no rash or abnormality of visible skin.   Psych: normal speech, not slurred, good insight, affect full  MSK: Moves all four limbs equally and normally, gait normal        Assessment & Plan:     60 y.o. female with the following -     1. Hormone replacement therapy (HRT)  Discussed hormone adjustments likely needed.  She will let me know what dosing she is on for her testosterone and estrogen pellets.  My suspicion is that she probably needs to get off the pellets and use something FDA cleared and covered and cost effective.  This would also allow us to adjust things more readily.  She may need to continue to reduce the estrogen by half but keep the higher progesterone dose as well.  Will get some labs today to see where she is at from a therapeutic level.  - TESTOSTERONE F&T FEMALES/CHILD; Future  - ESTROGEN TOTAL; Future  - PROGESTERONE; Future    2. Postmenopausal bone loss  She has been postmenopausal for over 4 to 5 years.  Never had a bone density done.  Will get this done for baseline.  - DS-BONE DENSITY STUDY (DEXA); Future    3. Encounter for screening mammogram for malignant neoplasm of breast  Prior reduction.  - MA-SCREENING MAMMO BILAT W/TOMOSYNTHESIS W/CAD; Future    4. Family history of chronic ischemic heart disease  Discussed CT scan for coronary artery calcium scoring.  Does have a lot of family history of heart disease.  - CT-CARDIAC SCORING; Future    5. Exercise-induced asthma  Will empirically try " an inhaler for harder workouts to see how much this helps or not.  - albuterol 108 (90 Base) MCG/ACT Aero Soln inhalation aerosol; Inhale 2 Puffs every four hours as needed for Shortness of Breath.  Dispense: 1 Each; Refill: 3    6. Postmenopausal bleeding  She has an appointment mid-March with [facility].  We did discuss likely the need for endometrial biopsy and hormone adjustment.  - Referral to Gynecology            No follow-ups on file.    Please note that this dictation was created using voice recognition software. I have made every reasonable attempt to correct obvious errors, but I expect that there are errors of grammar and possibly content that I did not discover before finalizing the note.

## 2025-02-06 LAB — PROGEST SERPL-MCNC: 0.75 NG/ML

## 2025-02-08 LAB
ESTRADIOL SERPL HS-MCNC: 33.5 PG/ML
ESTROGEN SERPL CALC-MCNC: 59 PG/ML
ESTRONE SERPL-MCNC: 25.5 PG/ML

## 2025-02-09 LAB
SHBG SERPL-SCNC: 62 NMOL/L (ref 17–125)
TESTOST FREE SERPL-MCNC: 26.1 PG/ML (ref 0.6–3.8)
TESTOST SERPL-MCNC: 221 NG/DL (ref 9–55)

## 2025-03-03 ENCOUNTER — RESULTS FOLLOW-UP (OUTPATIENT)
Dept: MEDICAL GROUP | Facility: LAB | Age: 60
End: 2025-03-03
Payer: COMMERCIAL

## 2025-03-03 ENCOUNTER — HOSPITAL ENCOUNTER (OUTPATIENT)
Dept: RADIOLOGY | Facility: MEDICAL CENTER | Age: 60
End: 2025-03-03
Attending: FAMILY MEDICINE
Payer: COMMERCIAL

## 2025-03-03 DIAGNOSIS — M81.0 POSTMENOPAUSAL BONE LOSS: ICD-10-CM

## 2025-03-03 DIAGNOSIS — Z82.49 FAMILY HISTORY OF CHRONIC ISCHEMIC HEART DISEASE: ICD-10-CM

## 2025-03-03 DIAGNOSIS — Z12.31 ENCOUNTER FOR SCREENING MAMMOGRAM FOR MALIGNANT NEOPLASM OF BREAST: ICD-10-CM

## 2025-03-03 PROCEDURE — 77080 DXA BONE DENSITY AXIAL: CPT

## 2025-03-03 PROCEDURE — 4410556 CT-CARDIAC SCORING (SELF PAY ONLY)

## 2025-03-03 PROCEDURE — 77067 SCR MAMMO BI INCL CAD: CPT

## 2025-03-18 ENCOUNTER — HOSPITAL ENCOUNTER (OUTPATIENT)
Dept: RADIOLOGY | Facility: MEDICAL CENTER | Age: 60
End: 2025-03-18
Attending: FAMILY MEDICINE
Payer: COMMERCIAL

## 2025-03-18 DIAGNOSIS — R92.8 ABNORMAL MAMMOGRAM: ICD-10-CM

## 2025-03-18 PROCEDURE — 76642 ULTRASOUND BREAST LIMITED: CPT | Mod: LT

## 2025-03-18 PROCEDURE — 77065 DX MAMMO INCL CAD UNI: CPT | Mod: LT

## 2025-03-19 ENCOUNTER — HOSPITAL ENCOUNTER (OUTPATIENT)
Facility: MEDICAL CENTER | Age: 60
End: 2025-03-19
Payer: COMMERCIAL

## 2025-03-24 LAB
HPV I/H RISK 1 DNA SPEC QL NAA+PROBE: NOT DETECTED
SPECIMEN SOURCE: NORMAL
THINPREP PAP, CYTOLOGY NL11781: NORMAL

## 2025-04-02 ENCOUNTER — HOSPITAL ENCOUNTER (OUTPATIENT)
Facility: MEDICAL CENTER | Age: 60
End: 2025-04-02
Payer: COMMERCIAL

## 2025-04-02 LAB — PATHOLOGY CONSULT NOTE: NORMAL

## 2025-04-02 PROCEDURE — 88305 TISSUE EXAM BY PATHOLOGIST: CPT | Performed by: PATHOLOGY

## 2025-04-02 PROCEDURE — 88305 TISSUE EXAM BY PATHOLOGIST: CPT | Mod: 26 | Performed by: PATHOLOGY

## 2025-04-03 ENCOUNTER — NON-PROVIDER VISIT (OUTPATIENT)
Dept: GENETICS | Facility: MEDICAL CENTER | Age: 60
End: 2025-04-03
Payer: COMMERCIAL

## 2025-04-03 NOTE — PROGRESS NOTES
Romina Serrato is a 60 y.o. female here for a non-provider visit for: Lab Draws  on 4/3/2025 at 12:27 PM    Procedure Performed: Venipuncture     Anatomical site: Right Hand     Equipment used: 25 butterfly    Labs drawn: 50 Cubes (two lavender EDTA tubes)    Ordering Provider: [employer]    Buck By: Heidi Orozco, Med Ass't

## 2025-05-13 ENCOUNTER — APPOINTMENT (OUTPATIENT)
Dept: URBAN - METROPOLITAN AREA CLINIC 6 | Facility: CLINIC | Age: 60
Setting detail: DERMATOLOGY
End: 2025-05-13

## 2025-05-13 DIAGNOSIS — Z85.828 PERSONAL HISTORY OF OTHER MALIGNANT NEOPLASM OF SKIN: ICD-10-CM | Status: STABLE

## 2025-05-13 DIAGNOSIS — D18.0 HEMANGIOMA: ICD-10-CM

## 2025-05-13 DIAGNOSIS — D17 BENIGN LIPOMATOUS NEOPLASM: ICD-10-CM

## 2025-05-13 DIAGNOSIS — L65.8 OTHER SPECIFIED NONSCARRING HAIR LOSS: ICD-10-CM

## 2025-05-13 DIAGNOSIS — D22 MELANOCYTIC NEVI: ICD-10-CM

## 2025-05-13 DIAGNOSIS — L81.4 OTHER MELANIN HYPERPIGMENTATION: ICD-10-CM

## 2025-05-13 DIAGNOSIS — Z71.89 OTHER SPECIFIED COUNSELING: ICD-10-CM

## 2025-05-13 PROBLEM — D17.23 BENIGN LIPOMATOUS NEOPLASM OF SKIN AND SUBCUTANEOUS TISSUE OF RIGHT LEG: Status: ACTIVE | Noted: 2025-05-13

## 2025-05-13 PROBLEM — D22.5 MELANOCYTIC NEVI OF TRUNK: Status: ACTIVE | Noted: 2025-05-13

## 2025-05-13 PROBLEM — D23.5 OTHER BENIGN NEOPLASM OF SKIN OF TRUNK: Status: ACTIVE | Noted: 2025-05-13

## 2025-05-13 PROBLEM — D18.01 HEMANGIOMA OF SKIN AND SUBCUTANEOUS TISSUE: Status: ACTIVE | Noted: 2025-05-13

## 2025-05-13 PROCEDURE — ? COUNSELING

## 2025-05-13 PROCEDURE — ? SUNSCREEN TREATMENT REGIMEN

## 2025-05-13 PROCEDURE — 99214 OFFICE O/P EST MOD 30 MIN: CPT

## 2025-05-13 PROCEDURE — ? DEFER

## 2025-05-13 PROCEDURE — ? PRESCRIPTION MEDICATION MANAGEMENT

## 2025-05-13 PROCEDURE — ? PRESCRIPTION

## 2025-05-13 PROCEDURE — ? ORDER TESTS

## 2025-05-13 RX ORDER — SPIRONOLACTONE 100 MG/1
1 TABLET, FILM COATED ORAL QD
Qty: 90 | Refills: 6 | Status: ERX

## 2025-05-13 RX ORDER — MINOXIDIL 2.5 MG/1
1 TABLET ORAL QD
Qty: 30 | Refills: 6 | Status: ERX

## 2025-05-13 RX ORDER — FINASTERIDE 5 MG/1
0.5 TABLET, FILM COATED ORAL QD
Qty: 30 | Refills: 4 | Status: ERX | COMMUNITY
Start: 2025-05-13

## 2025-05-13 RX ADMIN — FINASTERIDE 0.5: 5 TABLET, FILM COATED ORAL at 00:00

## 2025-05-13 ASSESSMENT — LOCATION DETAILED DESCRIPTION DERM
LOCATION DETAILED: MIDDLE STERNUM
LOCATION DETAILED: LEFT INFERIOR FOREHEAD
LOCATION DETAILED: PERIUMBILICAL SKIN
LOCATION DETAILED: LEFT RADIAL DORSAL HAND
LOCATION DETAILED: RIGHT SUPERIOR PARIETAL SCALP
LOCATION DETAILED: EPIGASTRIC SKIN
LOCATION DETAILED: MID-FRONTAL SCALP
LOCATION DETAILED: RIGHT PROXIMAL POSTERIOR THIGH
LOCATION DETAILED: RIGHT RADIAL DORSAL HAND

## 2025-05-13 ASSESSMENT — LOCATION SIMPLE DESCRIPTION DERM
LOCATION SIMPLE: RIGHT HAND
LOCATION SIMPLE: LEFT HAND
LOCATION SIMPLE: SCALP
LOCATION SIMPLE: CHEST
LOCATION SIMPLE: LEFT FOREHEAD
LOCATION SIMPLE: ANTERIOR SCALP
LOCATION SIMPLE: ABDOMEN
LOCATION SIMPLE: RIGHT POSTERIOR THIGH

## 2025-05-13 ASSESSMENT — LOCATION ZONE DERM
LOCATION ZONE: TRUNK
LOCATION ZONE: SCALP
LOCATION ZONE: HAND
LOCATION ZONE: LEG
LOCATION ZONE: FACE

## 2025-05-13 ASSESSMENT — WOMENS ALOPECIA SEVERITY SCALE: PLEASE ASSSESS THE PATIENT'S MID SCALP ALOPECIA SEVERITY BY COMPARING IT TO THESE PHOTOS: MODERATE HAIR LOSS

## 2025-05-13 NOTE — PROCEDURE: PRESCRIPTION MEDICATION MANAGEMENT
Detail Level: Zone
Initiate Treatment: Finasteride 5 mg tablet Qd
Render In Strict Bullet Format?: No
Continue Regimen: Minoxidil 2.5mg\\nSpironolactone 150mg

## 2025-05-29 ENCOUNTER — HOSPITAL ENCOUNTER (OUTPATIENT)
Dept: LAB | Facility: MEDICAL CENTER | Age: 60
End: 2025-05-29
Attending: DERMATOLOGY
Payer: COMMERCIAL

## 2025-05-29 LAB — POTASSIUM SERPL-SCNC: 4.2 MMOL/L (ref 3.6–5.5)

## 2025-05-29 PROCEDURE — 36415 COLL VENOUS BLD VENIPUNCTURE: CPT

## 2025-05-29 PROCEDURE — 84132 ASSAY OF SERUM POTASSIUM: CPT

## 2025-06-23 ENCOUNTER — APPOINTMENT (OUTPATIENT)
Dept: MEDICAL GROUP | Facility: LAB | Age: 60
End: 2025-06-23
Payer: COMMERCIAL

## 2025-06-26 ENCOUNTER — OFFICE VISIT (OUTPATIENT)
Dept: MEDICAL GROUP | Facility: LAB | Age: 60
End: 2025-06-26
Payer: COMMERCIAL

## 2025-06-26 VITALS
SYSTOLIC BLOOD PRESSURE: 110 MMHG | RESPIRATION RATE: 16 BRPM | HEIGHT: 66 IN | DIASTOLIC BLOOD PRESSURE: 68 MMHG | HEART RATE: 70 BPM | BODY MASS INDEX: 27.8 KG/M2 | OXYGEN SATURATION: 99 % | TEMPERATURE: 98.3 F | WEIGHT: 173 LBS

## 2025-06-26 DIAGNOSIS — Z79.890 HORMONE REPLACEMENT THERAPY (HRT): Primary | ICD-10-CM

## 2025-06-26 RX ORDER — ESTRADIOL 0.5 MG/1
0.5 TABLET ORAL DAILY
Qty: 90 TABLET | Refills: 1 | Status: SHIPPED | OUTPATIENT
Start: 2025-06-26

## 2025-06-26 NOTE — PROGRESS NOTES
"Subjective:     Chief Complaint   Patient presents with    Other     HRT          HPI:   Romina presents today for HRT management. Was on bioT and estrogen pellets. Stopped this. Was having a lot of bleeding.  Last pellet implantation early January.  Still remains on oral progesterone 100 mg.  Tree gallegozick score 11.6%.   Right now she is feeling pretty good.  She is not having any hot flashes or night sweats.  She is she really needs to continue to take the progesterone or be on something else.  She does not know if there are silent benefits to hormones she is just not sure where to go next.  Last testosterone draw was in February and was over 200.      Current Medications and Prescriptions Ordered in Epic[1]      ROS:  Gen: no fevers/chills, no changes in weight  Eyes: no changes in vision  ENT: no sore throat, no hearing loss, no bloody nose  Pulm: no sob, no cough  CV: no chest pain, no palpitations  GI: no nausea/vomiting, no diarrhea  : no dysuria  MSk: no myalgias  Skin: no rash  Neuro: no headaches, no numbness/tingling  Heme/Lymph: no easy bruising      Objective:     Exam:  /68   Pulse 70   Temp 36.8 °C (98.3 °F)   Resp 16   Ht 1.676 m (5' 6\")   Wt 78.5 kg (173 lb)   LMP  (LMP Unknown)   SpO2 99%   BMI 27.92 kg/m²  Body mass index is 27.92 kg/m².    Gen: AAOx3, NAD, well appearing  HEENT: NCAT, EOMI, Nares patent, Mucosa moist  Resp: Normal chest wall rise and fall, not SOB, no tachypnea  Skin: no rash or abnormality of visible skin.   Psych: normal speech, not slurred, good insight, affect full  MSK: Moves all four limbs equally and normally, gait normal        Assessment & Plan:     60 y.o. female with the following -     1. Hormone replacement therapy (HRT) (Primary)  What to be conversation with regard to use of progesterone, estrogen, testosterone.  Discussed that she could be on progesterone by itself for a very long time without having to buffer it with estrogen.  The problem though " is that she is then missing out on benefits of estrogen.  We discussed silent benefits of estrogen.  Even though she is not having hot flashes and night sweats right now estrogen can be very helpful with regard to mood, brain function, heart disease risk reduction, osteoporosis risk reduction.  She is having a little bit of joint and soft tissue pain in the hip so this could be estrogen related as well.  We discussed if she is on estrogen she needs to be on progesterone or something else to buffer the estrogen.  Discussed Duavee as a possible medication option in the future if we just want estrogen benefits without triggering receptors in the breast and uterus and that way she would have to be on progesterone.  Discussed rechecking her testosterone level as well.  Will go ahead and start an estrogen today for silent benefits and then recheck the level in 3 months to see if she needs to continue this or adjust the dose.  Anything over 30 would be considered beneficial.  Will also recheck her testosterone at that time too, and then we can decide if she needs further supplementation with this or if she is fine to continue as is.  Discussed testosterone supplementation and keeping this topical.  Discussed using male products at a female dosing concentration.  That way she can get it from an FDA regulated and approved manufacture rather than a compounding pharmacy.  It is also much smaller amount/volume to have to use then from the compounding pharmacy.  Discussed risk and benefits possible side effects from estrogen.  She will let me know if there is any concerns.  - estradiol (ESTRACE) 0.5 MG tablet; Take 1 Tablet by mouth every day.  Dispense: 90 Tablet; Refill: 1  - ESTRADIOL; Future  - TESTOSTERONE F&T FEMALES/CHILD; Future            No follow-ups on file.    Please note that this dictation was created using voice recognition software. I have made every reasonable attempt to correct obvious errors, but I expect  that there are errors of grammar and possibly content that I did not discover before finalizing the note.             [1]   Current Outpatient Medications Ordered in Epic   Medication Sig Dispense Refill    progesterone (PROMETRIUM) 100 MG Cap       spironolactone (ALDACTONE) 100 MG Tab       albuterol 108 (90 Base) MCG/ACT Aero Soln inhalation aerosol Inhale 2 Puffs every four hours as needed for Shortness of Breath. 1 Each 3    Multiple Vitamin (ONE-A-DAY ESSENTIAL PO) Take  by mouth.      Ascorbic Acid (VITAMIN C PO) Take  by mouth.      minoxidil (LONITEN) 2.5 MG Tab Take 1.25 mg by mouth every day.      cyclosporin (RESTASIS) 0.05 % ophthalmic emulsion Administer 1 Drop into both eyes 2 times a day.       No current [facility]-ordered facility-administered medications on file.

## (undated) DEVICE — SYRINGE 20 ML LS (48/BX 4BX/CA)

## (undated) DEVICE — GOWN SURGEONS X-LARGE - DISP. (30/CA)

## (undated) DEVICE — SUTURE GENERAL

## (undated) DEVICE — CHLORAPREP 26 ML APPLICATOR - ORANGE TINT(25/CA)

## (undated) DEVICE — ELECTRODE 5MM LHK LAPSCP STERILE DISP- MEGADYNE  (5/CA)

## (undated) DEVICE — SET LEADWIRE 5 LEAD BEDSIDE DISPOSABLE ECG (1SET OF 5/EA)

## (undated) DEVICE — SYSTEM CLEARIFY VISUALIZATION (10EA/PK)

## (undated) DEVICE — GOWN WARMING X-LARGE FLEX - (20/CA)

## (undated) DEVICE — SET EXTENSION WITH 2 PORTS (48EA/CA) ***PART #2C8610 IS A SUBSTITUTE*****

## (undated) DEVICE — SUCTION INSTRUMENT YANKAUER BULBOUS TIP W/O VENT (50EA/CA)

## (undated) DEVICE — SODIUM CHL IRRIGATION 0.9% 1000ML (12EA/CA)

## (undated) DEVICE — SET TUBING PNEUMOCLEAR HIGH FLOW SMOKE EVACUATION (10EA/BX)

## (undated) DEVICE — CANNULA W/SEAL 5X100 Z-THRE - ADED KII (12/BX)

## (undated) DEVICE — BLADE SURGICAL #15 - (50/BX 3BX/CA)

## (undated) DEVICE — ELECTRODE DUAL RETURN W/ CORD - (50/PK)

## (undated) DEVICE — SUTURE 4-0 VICRYL PLUS FS-2 - 27 INCH (36/BX)

## (undated) DEVICE — LACTATED RINGERS INJ 1000 ML - (14EA/CA 60CA/PF)

## (undated) DEVICE — GLOVE BIOGEL PI INDICATOR SZ 8.0 SURGICAL PF LF -(50/BX 4BX/CA)

## (undated) DEVICE — TOWEL STOP TIMEOUT SAFETY FLAG (40EA/CA)

## (undated) DEVICE — GLOVE SZ 7.5 BIOGEL PI MICRO - PF LF (50PR/BX)

## (undated) DEVICE — PACK GASTRIC BANDING OR - (1/CA)

## (undated) DEVICE — GLOVE BIOGEL SZ 8 SURGICAL PF LTX - (50PR/BX 4BX/CA)

## (undated) DEVICE — GLOVE BIOGEL SZ 8.5 SURGICAL PF LTX - (50PR/BX 4BX/CA)

## (undated) DEVICE — TROCAR Z THREAD12MM OPTICAL - NON BLADED (6/BX)

## (undated) DEVICE — TROCAR 5X100 NON BLADED Z-TH - READ KII (6/BX)

## (undated) DEVICE — TOWELS CLOTH SURGICAL - (4/PK 20PK/CA)

## (undated) DEVICE — GLOVE BIOGEL INDICATOR SZ 8 SURGICAL PF LTX - (50/BX 4BX/CA)

## (undated) DEVICE — HANDPIECE THUNDERBEAT 5MM 45CM FRONT GRIP TYPE S (5EA/BX)

## (undated) DEVICE — TUBING CLEARLINK DUO-VENT - C-FLO (48EA/CA)

## (undated) DEVICE — SUTURE 3-0 VICRYL PLUS SH - 27 INCH (36/BX)

## (undated) DEVICE — SENSOR OXIMETER ADULT SPO2 RD SET (20EA/BX)

## (undated) DEVICE — CANISTER SUCTION 3000ML MECHANICAL FILTER AUTO SHUTOFF MEDI-VAC NONSTERILE LF DISP  (40EA/CA)